# Patient Record
Sex: MALE | Race: WHITE | NOT HISPANIC OR LATINO | Employment: FULL TIME | URBAN - NONMETROPOLITAN AREA
[De-identification: names, ages, dates, MRNs, and addresses within clinical notes are randomized per-mention and may not be internally consistent; named-entity substitution may affect disease eponyms.]

---

## 2017-02-19 ENCOUNTER — OFFICE VISIT (OUTPATIENT)
Dept: RETAIL CLINIC | Facility: CLINIC | Age: 22
End: 2017-02-19

## 2017-02-19 VITALS — TEMPERATURE: 102.4 F | OXYGEN SATURATION: 98 % | RESPIRATION RATE: 18 BRPM | HEART RATE: 115 BPM

## 2017-02-19 DIAGNOSIS — J10.1 INFLUENZA A: Primary | ICD-10-CM

## 2017-02-19 LAB
EXPIRATION DATE: ABNORMAL
FLUAV AG NPH QL: POSITIVE
FLUBV AG NPH QL: NEGATIVE
INTERNAL CONTROL: ABNORMAL
Lab: ABNORMAL

## 2017-02-19 PROCEDURE — 87804 INFLUENZA ASSAY W/OPTIC: CPT | Performed by: NURSE PRACTITIONER

## 2017-02-19 PROCEDURE — 99213 OFFICE O/P EST LOW 20 MIN: CPT | Performed by: NURSE PRACTITIONER

## 2017-02-19 RX ORDER — OSELTAMIVIR PHOSPHATE 75 MG/1
75 CAPSULE ORAL 2 TIMES DAILY
Qty: 10 CAPSULE | Refills: 0 | Status: SHIPPED | OUTPATIENT
Start: 2017-02-19 | End: 2017-02-24

## 2017-02-19 RX ORDER — DEXTROMETHORPHAN HYDROBROMIDE AND PROMETHAZINE HYDROCHLORIDE 15; 6.25 MG/5ML; MG/5ML
5 SYRUP ORAL 4 TIMES DAILY PRN
Qty: 118 ML | Refills: 0 | Status: SHIPPED | OUTPATIENT
Start: 2017-02-19 | End: 2017-02-24

## 2017-02-19 NOTE — PROGRESS NOTES
URI      Wendy Aj is a 21 y.o. male.     URI    This is a new problem. Episode onset: 2 days ago  The problem has been rapidly worsening. Maximum temperature: 100.6 T. Max. Associated symptoms include chest pain (r/t coughing ), congestion, coughing, headaches, nausea, rhinorrhea and a sore throat. Pertinent negatives include no abdominal pain, diarrhea, ear pain, rash, vomiting or wheezing. Treatments tried: Diya [place] Cold and Flu last night; Cough drops  The treatment provided no relief.    Has not had influenza vaccine.  Wife ill with similar symptoms.  See ROS.     There is no problem list on file for this patient.      No Known Allergies     No current outpatient prescriptions on file prior to visit.     No current facility-administered medications on file prior to visit.        Past Medical History   Diagnosis Date   • Migraines        Past Surgical History   Procedure Laterality Date   • Cholecystectomy  10/06/2012       Family History   Problem Relation Age of Onset   • No Known Problems Mother    • No Known Problems Father    • No Known Problems Sister    • No Known Problems Brother        Social History     Social History   • Marital status:      Spouse name: N/A   • Number of children: N/A   • Years of education: N/A     Occupational History   • Not on file.     Social History Main Topics   • Smoking status: Never Smoker   • Smokeless tobacco: Never Used   • Alcohol use No   • Drug use: No   • Sexual activity: Yes     Partners: Female     Other Topics Concern   • Not on file     Social History Narrative   • No narrative on file       Travel:  No recent travel within the last 21 days outside the [place] Denies recent travel to one of the following [place]ries:  [place], [place], [place], or [place].  Denies contact with anyone who has traveled to one of the following [place]  Countries: [place], [place], [place], or [place] within the last 21 days and is known or  suspected to have Ebola.  Denies having had any contact with the human remains, blood or any bodily fluids of someone who is known or suspected to have Ebola within the last 21 days.     Review of Systems   Constitutional: Positive for chills, diaphoresis, fatigue and fever.   HENT: Positive for congestion, postnasal drip, rhinorrhea, sore throat and voice change (hoarseness ). Negative for ear discharge, ear pain, mouth sores and nosebleeds.    Respiratory: Positive for cough. Negative for shortness of breath and wheezing.    Cardiovascular: Positive for chest pain (r/t coughing ).   Gastrointestinal: Positive for nausea. Negative for abdominal pain, diarrhea and vomiting.   Musculoskeletal: Positive for myalgias.   Skin: Negative for rash.   Neurological: Positive for dizziness and headaches.       Visit Vitals   • Pulse 115   • Temp (!) 102.4 °F (39.1 °C) (Oral)   • Resp 18   • SpO2 98%       Objective   Physical Exam   Constitutional: He is oriented to person, place, and time. He appears well-developed and well-nourished. He is cooperative. He appears ill. No distress.   HENT:   Head: Normocephalic and atraumatic.   Right Ear: Tympanic membrane, external ear and ear canal normal.   Left Ear: Tympanic membrane, external ear and ear canal normal.   Nose: Rhinorrhea present. Right sinus exhibits no maxillary sinus tenderness and no frontal sinus tenderness. Left sinus exhibits no maxillary sinus tenderness and no frontal sinus tenderness.   Mouth/Throat: Uvula is midline, oropharynx is clear and moist and mucous membranes are normal. Tonsils are 2+ on the right. Tonsils are 2+ on the left. No tonsillar exudate.   Bilateral nasal congestion, tonsil stone left tonsil   Cardiovascular: Normal rate, regular rhythm and normal heart sounds.    Pulmonary/Chest: Effort normal and breath sounds normal.   Lymphadenopathy:        Head (right side): Tonsillar adenopathy present.        Head (left side): Tonsillar adenopathy  present.     He has no cervical adenopathy.   Neurological: He is alert and oriented to person, place, and time.   Skin: Skin is warm and intact. No rash noted. He is diaphoretic.       Assessment/Plan   Dusty was seen today for uri.    Diagnoses and all orders for this visit:    Influenza A  -     POCT Influenza A/B    Other orders  -     oseltamivir (TAMIFLU) 75 MG capsule; Take 1 capsule by mouth 2 (Two) Times a Day for 5 days.  -     promethazine-dextromethorphan (PROMETHAZINE-DM) 6.25-15 MG/5ML syrup; Take 5 mL by mouth 4 (Four) Times a Day As Needed for cough for up to 5 days.        Results for orders placed or performed in visit on 02/19/17   POCT Influenza A/B   Result Value Ref Range    Rapid Influenza A Ag Positive     Rapid Influenza B Ag negative     Internal Control Passed Passed    Lot Number [id number]     Expiration Date 8/2017        Return if symptoms worsen or fail to improve.

## 2017-02-19 NOTE — PATIENT INSTRUCTIONS
"Influenza, Adult  Influenza (\"the flu\") is a viral infection of the respiratory tract. It occurs more often in winter months because people spend more time in close contact with one another. Influenza can make you feel very sick. Influenza easily spreads from person to person (contagious).  CAUSES   Influenza is caused by a virus that infects the respiratory tract. You can catch the virus by breathing in droplets from an infected person's cough or sneeze. You can also catch the virus by touching something that was recently contaminated with the virus and then touching your mouth, nose, or eyes.  RISKS AND COMPLICATIONS  You may be at risk for a more severe case of influenza if you smoke cigarettes, have diabetes, have chronic heart disease (such as heart failure) or lung disease (such as asthma), or if you have a weakened immune system. Elderly people and pregnant women are also at risk for more serious infections. The most common problem of influenza is a lung infection (pneumonia). Sometimes, this problem can require emergency medical care and may be life threatening.  SIGNS AND SYMPTOMS   Symptoms typically last 4 to 10 days and may include:  · Fever.  · Chills.  · Headache, body aches, and muscle aches.  · Sore throat.  · Chest discomfort and cough.  · Poor appetite.  · Weakness or feeling tired.  · Dizziness.  · Nausea or vomiting.  DIAGNOSIS   Diagnosis of influenza is often made based on your history and a physical exam. A nose or throat swab test can be done to confirm the diagnosis.  TREATMENT   In mild cases, influenza goes away on its own. Treatment is directed at relieving symptoms. For more severe cases, your health care provider may prescribe antiviral medicines to shorten the sickness. Antibiotic medicines are not effective because the infection is caused by a virus, not by bacteria.  HOME CARE INSTRUCTIONS  · Take medicines only as directed by your health care provider.  · Use a cool mist humidifier " to make breathing easier.  · Get plenty of rest until your temperature returns to normal. This usually takes 3 to 4 days.  · Drink enough fluid to keep your urine clear or pale yellow.  · Cover your mouth and nose when coughing or sneezing, and wash your hands well to prevent the virus from spreading.  · Stay home from work or school until the fever is gone for at least 1 full day.  PREVENTION   An annual influenza vaccination (flu shot) is the best way to avoid getting influenza. An annual flu shot is now routinely recommended for all adults in the [place]  SEEK MEDICAL CARE IF:  · You experience chest pain, your cough worsens, or you produce more mucus.  · You have nausea, vomiting, or diarrhea.  · Your fever returns or gets worse.  SEEK IMMEDIATE MEDICAL CARE IF:  · You have trouble breathing, you become short of breath, or your skin or nails become bluish.  · You have severe pain or stiffness in the neck.  · You develop a sudden headache, or pain in the face or ear.  · You have nausea or vomiting that you cannot control.  MAKE SURE YOU:   · Understand these instructions.  · Will watch your condition.  · Will get help right away if you are not doing well or get worse.     This information is not intended to replace advice given to you by your health care provider. Make sure you discuss any questions you have with your health care provider.     Document Released: 12/15/2001 Document Revised: 01/08/2016 Document Reviewed: 10/11/2016  [employer] Patient Education ©2016 [employer]

## 2017-02-20 RX ORDER — DEXTROMETHORPHAN HYDROBROMIDE AND PROMETHAZINE HYDROCHLORIDE 15; 6.25 MG/5ML; MG/5ML
SYRUP ORAL
Qty: 118 ML | Refills: 0 | OUTPATIENT
Start: 2017-02-20

## 2017-05-15 ENCOUNTER — OFFICE VISIT (OUTPATIENT)
Dept: RETAIL CLINIC | Facility: CLINIC | Age: 22
End: 2017-05-15

## 2017-05-15 VITALS
HEART RATE: 74 BPM | HEIGHT: 72 IN | OXYGEN SATURATION: 98 % | WEIGHT: 173 LBS | BODY MASS INDEX: 23.43 KG/M2 | TEMPERATURE: 98.5 F

## 2017-05-15 DIAGNOSIS — H66.001 ACUTE SUPPURATIVE OTITIS MEDIA OF RIGHT EAR WITHOUT SPONTANEOUS RUPTURE OF TYMPANIC MEMBRANE, RECURRENCE NOT SPECIFIED: Primary | ICD-10-CM

## 2017-05-15 PROCEDURE — 99213 OFFICE O/P EST LOW 20 MIN: CPT | Performed by: NURSE PRACTITIONER

## 2017-05-15 RX ORDER — AMOXICILLIN 500 MG/1
500 CAPSULE ORAL 2 TIMES DAILY
Qty: 20 CAPSULE | Refills: 0 | Status: SHIPPED | OUTPATIENT
Start: 2017-05-15 | End: 2017-05-25

## 2019-04-15 ENCOUNTER — OFFICE VISIT (OUTPATIENT)
Dept: UROLOGY | Facility: CLINIC | Age: 24
End: 2019-04-15

## 2019-04-15 VITALS
SYSTOLIC BLOOD PRESSURE: 130 MMHG | BODY MASS INDEX: 22.82 KG/M2 | HEIGHT: 73 IN | HEART RATE: 58 BPM | OXYGEN SATURATION: 99 % | DIASTOLIC BLOOD PRESSURE: 82 MMHG

## 2019-04-15 DIAGNOSIS — R39.9 LOWER URINARY TRACT SYMPTOMS (LUTS): ICD-10-CM

## 2019-04-15 DIAGNOSIS — R30.0 DYSURIA: Primary | ICD-10-CM

## 2019-04-15 DIAGNOSIS — R10.2 PELVIC PAIN: ICD-10-CM

## 2019-04-15 LAB
BILIRUB BLD-MCNC: NEGATIVE MG/DL
CLARITY, POC: CLEAR
COLOR UR: YELLOW
GLUCOSE UR STRIP-MCNC: NEGATIVE MG/DL
KETONES UR QL: NEGATIVE
LEUKOCYTE EST, POC: NEGATIVE
NITRITE UR-MCNC: NEGATIVE MG/ML
PH UR: 6 [PH] (ref 5–8)
PROT UR STRIP-MCNC: NEGATIVE MG/DL
RBC # UR STRIP: NEGATIVE /UL
SP GR UR: 1.02 (ref 1–1.03)
UROBILINOGEN UR QL: NORMAL

## 2019-04-15 PROCEDURE — 81003 URINALYSIS AUTO W/O SCOPE: CPT | Performed by: UROLOGY

## 2019-04-15 PROCEDURE — 51798 US URINE CAPACITY MEASURE: CPT | Performed by: UROLOGY

## 2019-04-15 PROCEDURE — 99244 OFF/OP CNSLTJ NEW/EST MOD 40: CPT | Performed by: UROLOGY

## 2019-04-15 NOTE — PROGRESS NOTES
Chief Complaint  LUTS    Referring Provider  Miriam Quevedo MD    HPI  Mr. Aj is a 24 y.o. male with history of migraines who presents with dysuria. This has been ongoing for a year, intermittent, worse when dehydrated and urine is dark, not associated with urgency or frequency. This happens approx 1 time per day.   Says his stream is weaker than normal. No recent or notable trauma to perineum  No nocturia.   No gross hematuria.   No Hx of urinary infections or STDs. [place] with wife only.   + pelvic pressure, mild 4/10  No perineal pain    Previous treatments include: Abx, pyridium    no Constipation  no History of kidney stones  Drinks 8 glasses of water a day    Past Medical History  Past Medical History:   Diagnosis Date   • Migraines        Past Surgical History  Past Surgical History:   Procedure Laterality Date   • CHOLECYSTECTOMY  10/06/2012       Medications  No current outpatient medications on file.    Allergies  No Known Allergies    Social History  Social History     Socioeconomic History   • Marital status:      Spouse name: Not on file   • Number of children: Not on file   • Years of education: Not on file   • Highest education level: Not on file   Tobacco Use   • Smoking status: Never Smoker   • Smokeless tobacco: Never Used   Substance and Sexual Activity   • Alcohol use: No   • Drug use: No   • Sexual activity: Yes     Partners: Female       Family History  He has no family history of prostate cancer    Review of Systems  Constitutional: No fevers or chills  Skin: Negative for rash  Endocrine: No heat/cold intolerance   Cardiovascular: Negative for chest pain or dyspnea on exertion  Respiratory: Negative for shortness of breath or wheezing  Gastrointestinal: No nausea or vomiting  Genitourinary: Negative for current gross hematuria.  Musculoskeletal: No flank pain  Neurological:  Negative for frequent headaches or dizziness  Lymph/Heme: Negative for leg swelling or calf  "pain.    Physical Exam  Visit Vitals  /82   Pulse 58   Ht 185.4 cm (73\")   SpO2 99%   BMI 22.82 kg/m²     Constitutional: NAD, WDWN.   HEENT: NCAT. Conjunctivae normal.  MMM.    Cardiovascular: Regular rate.  Pulmonary/Chest: Respirations are even and non-labored bilaterally.  Abdominal: Soft. No distension, tenderness, masses or guarding. No CVA tenderness.  Neurological: A + O x 3.  Cranial Nerves II-XII grossly intact. Normal gait.  Extremities: TERELL x 4, Warm. No clubbing.  No cyanosis.    Skin: Pink, warm and dry.  No rashes noted.  Psychiatric:  Normal mood and affect    Genitourinary  Penis: circumcised penis, glans normal, no penile discharge.  No rashes/lesions.    Testes: descended bilaterally, no masses, nontender to palpation. Remainder of scrotal contents normal. No hernia appreciated.  Rectal:  Normal tone, no masses.  Prostate:  30 grams.  Symmetric, non-tender, anodular and no induration.      Labs  No results found for: PSA    Brief Urine Lab Results  (Last result in the past 365 days)      Color   Clarity   Blood   Leuk Est   Nitrite   Protein   CREAT   Urine HCG        04/15/19 0923 Yellow Clear Negative Negative Negative Negative               PVR  Post-void residual performed by staff - 29      Assessment  Mr. Aj is a 24 y.o. male who presents with LUTS, primarily dysuria, pelvic pressure, and weak stream.    Plan  1.  FU for cystoscopy to eval for urethral stricture  2.  Urine for ureaplasma      Aryan Arndt MD    "

## 2019-04-17 LAB
BACTERIA UR CULT: NO GROWTH
BACTERIA UR CULT: NORMAL

## 2019-04-25 ENCOUNTER — PROCEDURE VISIT (OUTPATIENT)
Dept: UROLOGY | Facility: CLINIC | Age: 24
End: 2019-04-25

## 2019-04-25 VITALS — WEIGHT: 173 LBS | RESPIRATION RATE: 16 BRPM | BODY MASS INDEX: 22.93 KG/M2 | HEIGHT: 73 IN

## 2019-04-25 DIAGNOSIS — R39.9 LOWER URINARY TRACT SYMPTOMS (LUTS): Primary | ICD-10-CM

## 2019-04-25 DIAGNOSIS — K59.00 CONSTIPATION, UNSPECIFIED CONSTIPATION TYPE: ICD-10-CM

## 2019-04-25 PROCEDURE — 51741 ELECTRO-UROFLOWMETRY FIRST: CPT | Performed by: UROLOGY

## 2019-04-25 PROCEDURE — 52000 CYSTOURETHROSCOPY: CPT | Performed by: UROLOGY

## 2019-04-25 NOTE — PROGRESS NOTES
Preprocedure diagnosis  LUTS    Postprocedure diagnosis  Same    Procedure  Flexible Cystourethroscopy    Attending surgeon  Aryan Arndt MD    Anesthesia  2% lidocaine jelly intraurethrally    Complications  None    Indications  24 y.o. male undergoing a flexible cystoscopy for the above mentioned indications.  Informed consent was obtained.      Findings  Cystoscopic findings included one right and left ureteral orifice in the normal anatomic position with normal bladder mucosa and no tumors, masses or stones. The urethral urothelium was within normal limits with no strictures.  There was not a prominent median lobe.  The lateral lobes not obstructive in appearance.  There was no urethral pathology to represent obstruction.  There was evidence of constipation from the rectum pressing on the bladder.    Procedure  The patient was placed in supine position and prepped and draped in sterile fashion with lidocaine jelly per urethra for anesthesia.  A timeout was performed.  The 14F flexible cystoscope was lubricated and gently placed through the penile urethra and into the bladder.  The bladder was completely visualized.  The cystoscope was retroflexed and the bladder neck and prostate visualized.  The cystoscope was slowly withdrawn while visualizing the urethra and the procedure terminated.  The patient tolerated the procedure well.      Uroflow:  Peak flow rate -21.5 mL/sec  Average flow rate -11.7 mL/sec  Flow curve -bell-shaped, normal  Voided volume -110 mL mL    I personally reviewed  and interpreted this study.     I provided him reassurance that we did not find any urinary infectious organisms and there is no mechanical or functional abnormality in his urinary stream.  He had evidence of constipation on cystoscopy, and I believe that this is likely factoring into the suprapubic pressure he is feeling.    Plan   Follow-up as needed  I recommended daily MiraLAX and increased water intake

## 2024-01-24 ENCOUNTER — HOSPITAL ENCOUNTER (EMERGENCY)
Facility: HOSPITAL | Age: 29
Discharge: HOME | End: 2024-01-24
Attending: STUDENT IN AN ORGANIZED HEALTH CARE EDUCATION/TRAINING PROGRAM | Admitting: STUDENT IN AN ORGANIZED HEALTH CARE EDUCATION/TRAINING PROGRAM
Payer: COMMERCIAL

## 2024-01-24 ENCOUNTER — APPOINTMENT (OUTPATIENT)
Dept: GENERAL RADIOLOGY | Facility: HOSPITAL | Age: 29
End: 2024-01-24
Payer: COMMERCIAL

## 2024-01-24 VITALS
HEIGHT: 72 IN | HEART RATE: 73 BPM | BODY MASS INDEX: 32.51 KG/M2 | WEIGHT: 240 LBS | TEMPERATURE: 98.2 F | DIASTOLIC BLOOD PRESSURE: 77 MMHG | OXYGEN SATURATION: 98 % | SYSTOLIC BLOOD PRESSURE: 133 MMHG | RESPIRATION RATE: 19 BRPM

## 2024-01-24 DIAGNOSIS — J11.1 INFLUENZA: Primary | ICD-10-CM

## 2024-01-24 LAB
ALBUMIN SERPL-MCNC: 5 G/DL (ref 3.5–5.2)
ALBUMIN/GLOB SERPL: 1.3 G/DL
ALP SERPL-CCNC: 93 U/L (ref 39–117)
ALT SERPL W P-5'-P-CCNC: 37 U/L (ref 1–41)
ANION GAP SERPL CALCULATED.3IONS-SCNC: 12.4 MMOL/L (ref 5–15)
AST SERPL-CCNC: 28 U/L (ref 1–40)
BASOPHILS # BLD AUTO: 0.05 10*3/MM3 (ref 0–0.2)
BASOPHILS NFR BLD AUTO: 0.7 % (ref 0–1.5)
BILIRUB SERPL-MCNC: 0.6 MG/DL (ref 0–1.2)
BUN SERPL-MCNC: 14 MG/DL (ref 6–20)
BUN/CREAT SERPL: 12.5 (ref 7–25)
CALCIUM SPEC-SCNC: 9.7 MG/DL (ref 8.6–10.5)
CHLORIDE SERPL-SCNC: 102 MMOL/L (ref 98–107)
CO2 SERPL-SCNC: 24.6 MMOL/L (ref 22–29)
CREAT SERPL-MCNC: 1.12 MG/DL (ref 0.76–1.27)
DEPRECATED RDW RBC AUTO: 37.4 FL (ref 37–54)
EGFRCR SERPLBLD CKD-EPI 2021: 92.3 ML/MIN/1.73
EOSINOPHIL # BLD AUTO: 0.09 10*3/MM3 (ref 0–0.4)
EOSINOPHIL NFR BLD AUTO: 1.2 % (ref 0.3–6.2)
ERYTHROCYTE [DISTWIDTH] IN BLOOD BY AUTOMATED COUNT: 11.9 % (ref 12.3–15.4)
FLUAV SUBTYP SPEC NAA+PROBE: NOT DETECTED
FLUBV RNA ISLT QL NAA+PROBE: DETECTED
GLOBULIN UR ELPH-MCNC: 3.9 GM/DL
GLUCOSE SERPL-MCNC: 106 MG/DL (ref 65–99)
HCT VFR BLD AUTO: 49.4 % (ref 37.5–51)
HGB BLD-MCNC: 17.3 G/DL (ref 13–17.7)
HOLD SPECIMEN: NORMAL
HOLD SPECIMEN: NORMAL
IMM GRANULOCYTES # BLD AUTO: 0.04 10*3/MM3 (ref 0–0.05)
IMM GRANULOCYTES NFR BLD AUTO: 0.5 % (ref 0–0.5)
LYMPHOCYTES # BLD AUTO: 1.81 10*3/MM3 (ref 0.7–3.1)
LYMPHOCYTES NFR BLD AUTO: 24.5 % (ref 19.6–45.3)
MCH RBC QN AUTO: 30.1 PG (ref 26.6–33)
MCHC RBC AUTO-ENTMCNC: 35 G/DL (ref 31.5–35.7)
MCV RBC AUTO: 86.1 FL (ref 79–97)
MONOCYTES # BLD AUTO: 0.56 10*3/MM3 (ref 0.1–0.9)
MONOCYTES NFR BLD AUTO: 7.6 % (ref 5–12)
NEUTROPHILS NFR BLD AUTO: 4.85 10*3/MM3 (ref 1.7–7)
NEUTROPHILS NFR BLD AUTO: 65.5 % (ref 42.7–76)
NRBC BLD AUTO-RTO: 0 /100 WBC (ref 0–0.2)
PLATELET # BLD AUTO: 304 10*3/MM3 (ref 140–450)
PMV BLD AUTO: 9.9 FL (ref 6–12)
POTASSIUM SERPL-SCNC: 3.9 MMOL/L (ref 3.5–5.2)
PROT SERPL-MCNC: 8.9 G/DL (ref 6–8.5)
RBC # BLD AUTO: 5.74 10*6/MM3 (ref 4.14–5.8)
SARS-COV-2 RNA RESP QL NAA+PROBE: NOT DETECTED
SODIUM SERPL-SCNC: 139 MMOL/L (ref 136–145)
TROPONIN T SERPL HS-MCNC: 9 NG/L
WBC NRBC COR # BLD AUTO: 7.4 10*3/MM3 (ref 3.4–10.8)
WHOLE BLOOD HOLD COAG: NORMAL
WHOLE BLOOD HOLD SPECIMEN: NORMAL

## 2024-01-24 PROCEDURE — 71045 X-RAY EXAM CHEST 1 VIEW: CPT

## 2024-01-24 PROCEDURE — 80053 COMPREHEN METABOLIC PANEL: CPT | Performed by: STUDENT IN AN ORGANIZED HEALTH CARE EDUCATION/TRAINING PROGRAM

## 2024-01-24 PROCEDURE — 99284 EMERGENCY DEPT VISIT MOD MDM: CPT

## 2024-01-24 PROCEDURE — 93005 ELECTROCARDIOGRAM TRACING: CPT | Performed by: STUDENT IN AN ORGANIZED HEALTH CARE EDUCATION/TRAINING PROGRAM

## 2024-01-24 PROCEDURE — 84484 ASSAY OF TROPONIN QUANT: CPT | Performed by: STUDENT IN AN ORGANIZED HEALTH CARE EDUCATION/TRAINING PROGRAM

## 2024-01-24 PROCEDURE — 87636 SARSCOV2 & INF A&B AMP PRB: CPT | Performed by: STUDENT IN AN ORGANIZED HEALTH CARE EDUCATION/TRAINING PROGRAM

## 2024-01-24 PROCEDURE — 85025 COMPLETE CBC W/AUTO DIFF WBC: CPT | Performed by: STUDENT IN AN ORGANIZED HEALTH CARE EDUCATION/TRAINING PROGRAM

## 2024-01-24 RX ORDER — SODIUM CHLORIDE 0.9 % (FLUSH) 0.9 %
10 SYRINGE (ML) INJECTION AS NEEDED
Status: DISCONTINUED | OUTPATIENT
Start: 2024-01-24 | End: 2024-01-24 | Stop reason: HOSPADM

## 2024-01-24 RX ORDER — ASPIRIN 325 MG
325 TABLET ORAL ONCE
Status: COMPLETED | OUTPATIENT
Start: 2024-01-24 | End: 2024-01-24

## 2024-01-24 RX ADMIN — ASPIRIN 325 MG: 325 TABLET ORAL at 17:51

## 2024-01-24 NOTE — CONSULTS
17:57 Dr. Sewell requested resources be given to patient. Patient did not want a consult.  18:00 Therapist met with patient at bedside and provided patient with crisis lines, outpatient resources and coping skills for anxiety.  Therapist also discussed the availability of emergency behavioral health services 24/7 at through the [facility] and [facility].  Therapist assisted the patient in identifying risk factors that would indicate the need for higher level of care, such as acute withdrawal symptoms, thoughts to harm self or others and/or self-harming behavior(s). Encouraged patient to call 911, crisis hotlines, or present to the nearest emergency department should symptoms worsen, or in any crisis/emergency. The patient is agreeable and voiced understanding.      Aaron Chávez MA LPCA  01/24/2024

## 2024-01-24 NOTE — Clinical Note
[facility] EMERGENCY DEPARTMENT  [address] [address]  Phone: [phone]    Dusty Aj was seen and treated in our emergency department on 1/24/2024.  He may return to work on 01/27/2024.         Thank you for choosing [facility].    Alhaji Sewell MD

## 2024-01-24 NOTE — ED PROVIDER NOTES
Subjective:  History of Present Illness:    Patient is a 28-year-old male no significant medical history who presents today with concerns for chest pain and anxiety.  He reports intermittent chest pain over the last 2 months.  Says that he has had increased stress over the last 2 weeks that has noted increasing chest pain.  Had an especially bad episode today and presents to our emergency department for evaluation.  States he has no current chest pain at this time.  Denies any shortness of breath.  No cough or congestion.  Denies any preceding fevers.  No abdominal pain nausea vomiting or diarrhea.  No unilateral leg swelling or leg pain.  Denies any personal history of PE/DVT.      Nurses Notes reviewed and agree, including vitals, allergies, social history and prior medical history.     REVIEW OF SYSTEMS: All systems reviewed and not pertinent unless noted.  Review of Systems   Constitutional:  Negative for activity change, appetite change, chills, fatigue and fever.   HENT:  Negative for congestion, sinus pressure, sneezing and trouble swallowing.    Eyes:  Negative for discharge and itching.   Respiratory:  Negative for cough and shortness of breath.    Cardiovascular:  Positive for chest pain. Negative for palpitations.   Gastrointestinal:  Negative for abdominal distention and abdominal pain.   Endocrine: Negative for cold intolerance and heat intolerance.   Genitourinary:  Negative for decreased urine volume, dysuria and urgency.   Musculoskeletal:  Negative for gait problem, neck pain and neck stiffness.   Skin:  Negative for color change and rash.   Allergic/Immunologic: Negative for immunocompromised state.   Neurological:  Negative for facial asymmetry and headaches.   Hematological:  Negative for adenopathy.   Psychiatric/Behavioral:  Negative for self-injury and suicidal ideas. The patient is nervous/anxious.        Past Medical History:   Diagnosis Date    Migraines        Allergies:    Patient has no  "known allergies.      Past Surgical History:   Procedure Laterality Date    CHOLECYSTECTOMY  10/06/2012    COLON SURGERY           Social History     Socioeconomic History    Marital status:    Tobacco Use    Smoking status: Never    Smokeless tobacco: Never   Substance and Sexual Activity    Alcohol use: Yes     Comment: social    Drug use: No    Sexual activity: Yes     Partners: Female         Family History   Problem Relation Age of Onset    No Known Problems Mother     No Known Problems Father     No Known Problems Sister     No Known Problems Brother        Objective  Physical Exam:  /77   Pulse 73   Temp 98.2 °F (36.8 °C) (Oral)   Resp 19   Ht 182.9 cm (72\")   Wt 109 kg (240 lb)   SpO2 98%   BMI 32.55 kg/m²      Physical Exam  Constitutional:       General: He is not in acute distress.     Appearance: Normal appearance. He is normal weight. He is not ill-appearing.   HENT:      Head: Normocephalic and atraumatic.      Nose: Nose normal. No congestion or rhinorrhea.      Mouth/Throat:      Mouth: Mucous membranes are moist.      Pharynx: Oropharynx is clear.   Eyes:      Extraocular Movements: Extraocular movements intact.      Conjunctiva/sclera: Conjunctivae normal.      Pupils: Pupils are equal, round, and reactive to light.   Cardiovascular:      Rate and Rhythm: Normal rate and regular rhythm.      Pulses: Normal pulses.   Pulmonary:      Effort: Pulmonary effort is normal. No respiratory distress.      Breath sounds: Normal breath sounds.   Abdominal:      General: Abdomen is flat. Bowel sounds are normal. There is no distension.      Palpations: Abdomen is soft.      Tenderness: There is no abdominal tenderness.   Musculoskeletal:         General: No swelling or tenderness. Normal range of motion.      Cervical back: Normal range of motion and neck supple. No rigidity or tenderness.   Skin:     General: Skin is warm and dry.      Capillary Refill: Capillary refill takes less than 2 " seconds.   Neurological:      General: No focal deficit present.      Mental Status: He is alert and oriented to person, place, and time. Mental status is at baseline.      Cranial Nerves: No cranial nerve deficit.      Sensory: No sensory deficit.      Motor: No weakness.   Psychiatric:         Mood and Affect: Mood normal.         Behavior: Behavior normal.         Thought Content: Thought content normal.         Judgment: Judgment normal.         Procedures    ED Course:    ED Course as of 01/25/24 1530   Wed Jan 24, 2024   1740 EKG interpreted by me, normal sinus rhythm no clear ST changes noted, rate of 85 [JE]      ED Course User Index  [JE] Alhaji Sewell MD       Lab Results (last 24 hours)       Procedure Component Value Units Date/Time    CBC & Differential [[id number]]  (Abnormal) Collected: 01/24/24 1712    Specimen: Blood Updated: 01/24/24 1724    Narrative:      The following orders were created for panel order CBC & Differential.  Procedure                               Abnormality         Status                     ---------                               -----------         ------                     CBC Auto Differential[[id number]]        Abnormal            Final result                 Please view results for these tests on the individual orders.    Comprehensive Metabolic Panel [[id number]]  (Abnormal) Collected: 01/24/24 1712    Specimen: Blood Updated: 01/24/24 1735     Glucose 106 mg/dL      BUN 14 mg/dL      Creatinine 1.12 mg/dL      Sodium 139 mmol/L      Potassium 3.9 mmol/L      Chloride 102 mmol/L      CO2 24.6 mmol/L      Calcium 9.7 mg/dL      Total Protein 8.9 g/dL      Albumin 5.0 g/dL      ALT (SGPT) 37 U/L      AST (SGOT) 28 U/L      Alkaline Phosphatase 93 U/L      Total Bilirubin 0.6 mg/dL      Globulin 3.9 gm/dL      A/G Ratio 1.3 g/dL      BUN/Creatinine Ratio 12.5     Anion Gap 12.4 mmol/L      eGFR 92.3 mL/min/1.73     Narrative:      GFR Normal >60  Chronic Kidney Disease  <60  Kidney Failure <15      High Sensitivity Troponin T [[id number]]  (Normal) Collected: 01/24/24 1712    Specimen: Blood Updated: 01/24/24 1738     HS Troponin T 9 ng/L     Narrative:      High Sensitive Troponin T Reference Range:  <14.0 ng/L- Negative Female for AMI  <22.0 ng/L- Negative Male for AMI  >=14 - Abnormal Female indicating possible myocardial injury.  >=22 - Abnormal Male indicating possible myocardial injury.   Clinicians would have to utilize clinical acumen, EKG, Troponin, and serial changes to determine if it is an Acute Myocardial Infarction or myocardial injury due to an underlying chronic condition.         CBC Auto Differential [[id number]]  (Abnormal) Collected: 01/24/24 1712    Specimen: Blood Updated: 01/24/24 1724     WBC 7.40 10*3/mm3      RBC 5.74 10*6/mm3      Hemoglobin 17.3 g/dL      Hematocrit 49.4 %      MCV 86.1 fL      MCH 30.1 pg      MCHC 35.0 g/dL      RDW 11.9 %      RDW-SD 37.4 fl      MPV 9.9 fL      Platelets 304 10*3/mm3      Neutrophil % 65.5 %      Lymphocyte % 24.5 %      Monocyte % 7.6 %      Eosinophil % 1.2 %      Basophil % 0.7 %      Immature Grans % 0.5 %      Neutrophils, Absolute 4.85 10*3/mm3      Lymphocytes, Absolute 1.81 10*3/mm3      Monocytes, Absolute 0.56 10*3/mm3      Eosinophils, Absolute 0.09 10*3/mm3      Basophils, Absolute 0.05 10*3/mm3      Immature Grans, Absolute 0.04 10*3/mm3      nRBC 0.0 /100 WBC     COVID-19 and FLU A/B PCR, 1 HR TAT - Swab, Nasopharynx [[id number]]  (Abnormal) Collected: 01/24/24 1742    Specimen: Swab from Nasopharynx Updated: 01/24/24 1810     COVID19 Not Detected     Influenza A PCR Not Detected     Influenza B PCR Detected    Narrative:      Fact sheet for providers: https://www.fda.gov/media/061385/download    Fact sheet for patients: https://www.fda.gov/media/472899/download    Test performed by PCR.             XR Chest 1 View    Result Date: 1/25/2024  PROCEDURE: XR CHEST 1 VW-    HISTORY: Chest Pain Triage Protocol,  anxiety, precordial chest pain intermittently  COMPARISON: None.  FINDINGS: The heart is normal in size. The mediastinum is unremarkable. The lungs are clear. There is no pneumothorax. There are no acute osseous abnormalities.      Impression: No acute cardiopulmonary process.        Images were reviewed, interpreted, and dictated by Dr. Sandra Richey MD Transcribed by Aurora Thomas PA-C.  This report was signed and finalized on 1/25/2024 12:21 PM by Sandra Richey MD.          MDM     Amount and/or Complexity of Data Reviewed  Independent visualization of images, tracings, or specimens: yes        Initial impression of presenting illness: Chest pain    DDX: includes but is not limited to: ACS, MI, panic disorder    Patient arrives stable with vitals interpreted by myself.     Pertinent features from physical exam: Clear to auscultation, regular rate and rhythm, no murmur.    Initial diagnostic plan: CBC, CMP, troponin, ECG, chest x-ray    Results from initial plan were reviewed and interpreted by me revealing patient positive for influenza    Diagnostic information from other sources: Discussed with significant other bedside    Interventions / Re-evaluation: Observed in emergency department for over an hour with no change in vital signs    Results/clinical rationale were discussed with patient at bedside    Consultations/Discussion of results with other physicians: Discussed negative workup in our emergency department for cardiac process, patient's pain likely secondary to influenza diagnosis.  No shortness of breath, no pneumonia.  Encouraged oral hydration and PCP follow-up to ensure resolution of symptoms    Disposition plan: Discharge  -----        Final diagnoses:   Influenza          Alhaji Sewell MD  01/25/24 1682

## 2024-01-24 NOTE — DISCHARGE INSTRUCTIONS
You were evaluated for chest pain.  We got labs and chest x-ray that were all negative for any acute process.  You were swabbed for COVID and flu and you had the flu.  You are now stable for discharge.  As we discussed, we believe that your symptoms are likely due to the influenza virus causing pleurisy which is a swelling between your lungs and your rib cage.  We also provided you with some resources for behavioral health as an outpatient which would encourage you to follow-up with for further management evaluation of your anxiety.  You are now stable for discharge.

## 2024-02-18 ENCOUNTER — HOSPITAL ENCOUNTER (EMERGENCY)
Facility: HOSPITAL | Age: 29
Discharge: HOME | End: 2024-02-18
Attending: EMERGENCY MEDICINE | Admitting: EMERGENCY MEDICINE
Payer: COMMERCIAL

## 2024-02-18 VITALS
RESPIRATION RATE: 18 BRPM | TEMPERATURE: 98 F | BODY MASS INDEX: 31.83 KG/M2 | OXYGEN SATURATION: 97 % | HEART RATE: 86 BPM | WEIGHT: 235 LBS | DIASTOLIC BLOOD PRESSURE: 74 MMHG | HEIGHT: 72 IN | SYSTOLIC BLOOD PRESSURE: 132 MMHG

## 2024-02-18 DIAGNOSIS — I10 HYPERTENSION, UNSPECIFIED TYPE: Primary | ICD-10-CM

## 2024-02-18 DIAGNOSIS — F41.9 ANXIETY: ICD-10-CM

## 2024-02-18 DIAGNOSIS — M25.512 ACUTE PAIN OF LEFT SHOULDER: ICD-10-CM

## 2024-02-18 LAB
ALBUMIN SERPL-MCNC: 4.7 G/DL (ref 3.5–5.2)
ALBUMIN/GLOB SERPL: 1.6 G/DL
ALP SERPL-CCNC: 89 U/L (ref 39–117)
ALT SERPL W P-5'-P-CCNC: 70 U/L (ref 1–41)
ANION GAP SERPL CALCULATED.3IONS-SCNC: 11.2 MMOL/L (ref 5–15)
AST SERPL-CCNC: 26 U/L (ref 1–40)
BASOPHILS # BLD AUTO: 0.03 10*3/MM3 (ref 0–0.2)
BASOPHILS NFR BLD AUTO: 0.4 % (ref 0–1.5)
BILIRUB SERPL-MCNC: 0.6 MG/DL (ref 0–1.2)
BUN SERPL-MCNC: 10 MG/DL (ref 6–20)
BUN/CREAT SERPL: 9.3 (ref 7–25)
CALCIUM SPEC-SCNC: 9.1 MG/DL (ref 8.6–10.5)
CHLORIDE SERPL-SCNC: 102 MMOL/L (ref 98–107)
CO2 SERPL-SCNC: 23.8 MMOL/L (ref 22–29)
CREAT SERPL-MCNC: 1.08 MG/DL (ref 0.76–1.27)
DEPRECATED RDW RBC AUTO: 36.2 FL (ref 37–54)
EGFRCR SERPLBLD CKD-EPI 2021: 96.5 ML/MIN/1.73
EOSINOPHIL # BLD AUTO: 0.08 10*3/MM3 (ref 0–0.4)
EOSINOPHIL NFR BLD AUTO: 1.2 % (ref 0.3–6.2)
ERYTHROCYTE [DISTWIDTH] IN BLOOD BY AUTOMATED COUNT: 11.8 % (ref 12.3–15.4)
GLOBULIN UR ELPH-MCNC: 2.9 GM/DL
GLUCOSE SERPL-MCNC: 98 MG/DL (ref 65–99)
HCT VFR BLD AUTO: 43.6 % (ref 37.5–51)
HGB BLD-MCNC: 15.1 G/DL (ref 13–17.7)
IMM GRANULOCYTES # BLD AUTO: 0.04 10*3/MM3 (ref 0–0.05)
IMM GRANULOCYTES NFR BLD AUTO: 0.6 % (ref 0–0.5)
LYMPHOCYTES # BLD AUTO: 2.39 10*3/MM3 (ref 0.7–3.1)
LYMPHOCYTES NFR BLD AUTO: 34.9 % (ref 19.6–45.3)
MCH RBC QN AUTO: 29.4 PG (ref 26.6–33)
MCHC RBC AUTO-ENTMCNC: 34.6 G/DL (ref 31.5–35.7)
MCV RBC AUTO: 85 FL (ref 79–97)
MONOCYTES # BLD AUTO: 0.53 10*3/MM3 (ref 0.1–0.9)
MONOCYTES NFR BLD AUTO: 7.7 % (ref 5–12)
NEUTROPHILS NFR BLD AUTO: 3.77 10*3/MM3 (ref 1.7–7)
NEUTROPHILS NFR BLD AUTO: 55.2 % (ref 42.7–76)
NRBC BLD AUTO-RTO: 0 /100 WBC (ref 0–0.2)
PLATELET # BLD AUTO: 273 10*3/MM3 (ref 140–450)
PMV BLD AUTO: 10.3 FL (ref 6–12)
POTASSIUM SERPL-SCNC: 3.4 MMOL/L (ref 3.5–5.2)
PROT SERPL-MCNC: 7.6 G/DL (ref 6–8.5)
RBC # BLD AUTO: 5.13 10*6/MM3 (ref 4.14–5.8)
SODIUM SERPL-SCNC: 137 MMOL/L (ref 136–145)
T4 FREE SERPL-MCNC: 1.61 NG/DL (ref 0.93–1.7)
TROPONIN T SERPL HS-MCNC: <6 NG/L
TSH SERPL DL<=0.05 MIU/L-ACNC: 2.31 UIU/ML (ref 0.27–4.2)
WBC NRBC COR # BLD AUTO: 6.84 10*3/MM3 (ref 3.4–10.8)

## 2024-02-18 PROCEDURE — 80053 COMPREHEN METABOLIC PANEL: CPT | Performed by: EMERGENCY MEDICINE

## 2024-02-18 PROCEDURE — 85025 COMPLETE CBC W/AUTO DIFF WBC: CPT | Performed by: EMERGENCY MEDICINE

## 2024-02-18 PROCEDURE — 84484 ASSAY OF TROPONIN QUANT: CPT | Performed by: EMERGENCY MEDICINE

## 2024-02-18 PROCEDURE — 84443 ASSAY THYROID STIM HORMONE: CPT | Performed by: EMERGENCY MEDICINE

## 2024-02-18 PROCEDURE — 36415 COLL VENOUS BLD VENIPUNCTURE: CPT

## 2024-02-18 PROCEDURE — 99283 EMERGENCY DEPT VISIT LOW MDM: CPT

## 2024-02-18 PROCEDURE — 84439 ASSAY OF FREE THYROXINE: CPT | Performed by: EMERGENCY MEDICINE

## 2024-02-18 RX ORDER — HYDROXYZINE HYDROCHLORIDE 25 MG/1
25 TABLET, FILM COATED ORAL 3 TIMES DAILY PRN
Qty: 20 TABLET | Refills: 0 | Status: SHIPPED | OUTPATIENT
Start: 2024-02-18 | End: 2024-02-26 | Stop reason: SDUPTHER

## 2024-02-18 RX ORDER — LORAZEPAM 0.5 MG/1
1 TABLET ORAL ONCE
Status: COMPLETED | OUTPATIENT
Start: 2024-02-18 | End: 2024-02-18

## 2024-02-18 RX ADMIN — LORAZEPAM 1 MG: 0.5 TABLET ORAL at 08:47

## 2024-02-18 NOTE — ED PROVIDER NOTES
EMERGENCY DEPARTMENT ENCOUNTER    Pt Name: Dusty Aj  MRN: [id number]  Pt :   1995  Room Number:    Date of encounter:  2024  PCP: Provider, No Known  ED Provider: Pawan Torres MD    Historian: Patient      HPI:  Chief Complaint   Patient presents with    Anxiety          Context: Dusty Aj is a 28 y.o. male who presents to the ED c/o hypertension, left shoulder pain, as well as anxiety.  Patient reports his anxiety has been getting worse recently.  Has not been taking medications for, some from increased stress as well as his grandfather being sick.  Patient notes that when he is feeling anxious he checks his blood pressure and his heart rate in the both quite elevated.  Experiences palpitations at these times, no chest pain.  Was here previously for similar symptoms, had a workup and was negative.  He also reports left shoulder pain made worse by leaning on an office chair      PAST MEDICAL HISTORY  Past Medical History:   Diagnosis Date    Migraines          PAST SURGICAL HISTORY  Past Surgical History:   Procedure Laterality Date    CHOLECYSTECTOMY  10/06/2012    COLON SURGERY           FAMILY HISTORY  Family History   Problem Relation Age of Onset    No Known Problems Mother     No Known Problems Father     No Known Problems Sister     No Known Problems Brother          SOCIAL HISTORY  Social History     Socioeconomic History    Marital status:    Tobacco Use    Smoking status: Never    Smokeless tobacco: Never   Substance and Sexual Activity    Alcohol use: Yes     Comment: social    Drug use: No    Sexual activity: Yes     Partners: Female         ALLERGIES  Patient has no known allergies.        REVIEW OF SYSTEMS  Review of Systems   Constitutional:  Negative for chills and fever.   HENT:  Negative for sore throat and trouble swallowing.    Eyes:  Negative for pain and redness.   Respiratory:  Negative for cough and shortness of breath.    Cardiovascular:  Negative  for chest pain and leg swelling.   Gastrointestinal:  Negative for abdominal pain, nausea and vomiting.   Genitourinary:  Negative for dysuria and urgency.   Musculoskeletal:  Negative for back pain and neck pain.   Skin:  Negative for rash and wound.   Neurological:  Negative for dizziness and weakness.   Psychiatric/Behavioral:  The patient is nervous/anxious.         All systems reviewed and negative except for those discussed in HPI.       PHYSICAL EXAM    I have reviewed the triage vital signs and nursing notes.    ED Triage Vitals [02/18/24 0735]   Temp Heart Rate Resp BP SpO2   97.8 °F (36.6 °C) 85 18 147/95 100 %      Temp src Heart Rate Source Patient Position BP Location FiO2 (%)   Oral Monitor Sitting Right arm --       Physical Exam  Constitutional:       Appearance: Normal appearance. He is not ill-appearing.   HENT:      Head: Normocephalic and atraumatic.      Right Ear: External ear normal.      Left Ear: External ear normal.      Nose: Nose normal.      Mouth/Throat:      Mouth: Mucous membranes are moist.      Pharynx: Oropharynx is clear.   Eyes:      Extraocular Movements: Extraocular movements intact.      Conjunctiva/sclera: Conjunctivae normal.      Pupils: Pupils are equal, round, and reactive to light.   Cardiovascular:      Rate and Rhythm: Normal rate and regular rhythm.      Pulses:           Radial pulses are 2+ on the right side and 2+ on the left side.      Heart sounds: No murmur heard.  Pulmonary:      Effort: Pulmonary effort is normal.      Breath sounds: Normal breath sounds.   Abdominal:      General: There is no distension.      Tenderness: There is no abdominal tenderness. There is no guarding.   Musculoskeletal:         General: No swelling or deformity.      Right shoulder: Normal.      Left shoulder: Tenderness present. No bony tenderness. Normal range of motion. Normal strength. Normal pulse.      Cervical back: Normal range of motion and neck supple.   Skin:     General:  Skin is warm and dry.      Capillary Refill: Capillary refill takes less than 2 seconds.      Findings: No rash.   Neurological:      General: No focal deficit present.      Mental Status: He is alert and oriented to person, place, and time.            LAB RESULTS  Recent Results (from the past 24 hour(s))   Comprehensive Metabolic Panel    Collection Time: 02/18/24  8:15 AM    Specimen: Blood   Result Value Ref Range    Glucose 98 65 - 99 mg/dL    BUN 10 6 - 20 mg/dL    Creatinine 1.08 0.76 - 1.27 mg/dL    Sodium 137 136 - 145 mmol/L    Potassium 3.4 (L) 3.5 - 5.2 mmol/L    Chloride 102 98 - 107 mmol/L    CO2 23.8 22.0 - 29.0 mmol/L    Calcium 9.1 8.6 - 10.5 mg/dL    Total Protein 7.6 6.0 - 8.5 g/dL    Albumin 4.7 3.5 - 5.2 g/dL    ALT (SGPT) 70 (H) 1 - 41 U/L    AST (SGOT) 26 1 - 40 U/L    Alkaline Phosphatase 89 39 - 117 U/L    Total Bilirubin 0.6 0.0 - 1.2 mg/dL    Globulin 2.9 gm/dL    A/G Ratio 1.6 g/dL    BUN/Creatinine Ratio 9.3 7.0 - 25.0    Anion Gap 11.2 5.0 - 15.0 mmol/L    eGFR 96.5 >60.0 mL/min/1.73   TSH    Collection Time: 02/18/24  8:15 AM    Specimen: Blood   Result Value Ref Range    TSH 2.310 0.270 - 4.200 uIU/mL   T4, Free    Collection Time: 02/18/24  8:15 AM    Specimen: Blood   Result Value Ref Range    Free T4 1.61 0.93 - 1.70 ng/dL   Single High Sensitivity Troponin T    Collection Time: 02/18/24  8:15 AM    Specimen: Blood   Result Value Ref Range    HS Troponin T <6 <22 ng/L   CBC Auto Differential    Collection Time: 02/18/24  8:15 AM    Specimen: Blood   Result Value Ref Range    WBC 6.84 3.40 - 10.80 10*3/mm3    RBC 5.13 4.14 - 5.80 10*6/mm3    Hemoglobin 15.1 13.0 - 17.7 g/dL    Hematocrit 43.6 37.5 - 51.0 %    MCV 85.0 79.0 - 97.0 fL    MCH 29.4 26.6 - 33.0 pg    MCHC 34.6 31.5 - 35.7 g/dL    RDW 11.8 (L) 12.3 - 15.4 %    RDW-SD 36.2 (L) 37.0 - 54.0 fl    MPV 10.3 6.0 - 12.0 fL    Platelets 273 140 - 450 10*3/mm3    Neutrophil % 55.2 42.7 - 76.0 %    Lymphocyte % 34.9 19.6 - 45.3 %     Monocyte % 7.7 5.0 - 12.0 %    Eosinophil % 1.2 0.3 - 6.2 %    Basophil % 0.4 0.0 - 1.5 %    Immature Grans % 0.6 (H) 0.0 - 0.5 %    Neutrophils, Absolute 3.77 1.70 - 7.00 10*3/mm3    Lymphocytes, Absolute 2.39 0.70 - 3.10 10*3/mm3    Monocytes, Absolute 0.53 0.10 - 0.90 10*3/mm3    Eosinophils, Absolute 0.08 0.00 - 0.40 10*3/mm3    Basophils, Absolute 0.03 0.00 - 0.20 10*3/mm3    Immature Grans, Absolute 0.04 0.00 - 0.05 10*3/mm3    nRBC 0.0 0.0 - 0.2 /100 WBC       If labs were ordered, I independently reviewed the results and considered them in treating the patient.        RADIOLOGY  No Radiology Exams Resulted Within Past 24 Hours        PROCEDURES    Procedures    Interpretations    O2 Sat: The patients oxygen saturation was 100% on Room Air.  This was independently interpreted by me as Normal      Cardiac Monitoring: I reviewed and independently interpreted the Rhythm Strip as Normal Sinus rhythm rate of 85        MEDICATIONS GIVEN IN ER    Medications   LORazepam (ATIVAN) tablet 1 mg (1 mg Oral Given 2/18/24 0847)         MEDICAL DECISION MAKING, PROGRESS, and CONSULTS    All labs, if obtained, have been independently reviewed by me.  All radiology studies, if obtained, have been reviewed by me and the radiologist dictating the report.  All EKG's, if obtained, have been independently viewed and interpreted by me/my attending physician.      Discussion below represents my analysis of pertinent findings related to patient's condition, differential diagnosis, treatment plan and final disposition.      Differential diagnosis:    28-year-old male presented ED with hypertension, anxiety, shoulder pain.  Shoulder pain appears to be muscular in nature, likely rotator cuff strain versus trapezius muscle spasm.  No weakness, bony pain.  Do not think imaging would be helpful in this case.  Patient's anxiety seems to be getting worse, he has no suicidal or homicidal ideations, nothing he would benefit from a  psychiatric consult, will check basic labs including thyroid studies, give the patient oral Ativan and reevaluate    Additional Sources:  None      Orders placed during this visit:  Orders Placed This Encounter   Procedures    Comprehensive Metabolic Panel    TSH    T4, Free    Single High Sensitivity Troponin T    CBC Auto Differential    CBC & Differential         Additional orders considered but not ordered:  None    ED Course:    Consultants:  None    ED Course as of 02/18/24 0902   Sun Feb 18, 2024   0857 Patients labs are benign, bp is improved without intervention, feels better with ativan.  [CS]      ED Course User Index  [CS] Pawan Torres MD           After my consideration of clinical presentation and any laboratory/radiology studies obtained, I discussed the findings with the patient/patient representative who is in agreement with the treatment plan and the final disposition. Risks and benefits of discharge were discussed.     AS OF 09:02 EST VITALS:    BP - 147/95  HR - 85  TEMP - 97.8 °F (36.6 °C) (Oral)  O2 SATS - 100%    I reviewed the patients prescription monitoring report if available prior to discharge    DIAGNOSIS  Final diagnoses:   Hypertension, unspecified type   Anxiety   Acute pain of left shoulder         DISPOSITION  ED Disposition       ED Disposition   Discharge    Condition   Stable    Comment   --                   Please note that portions of this document were completed with voice recognition software.      Pawan Torres MD  02/18/24 0974

## 2024-02-23 ENCOUNTER — OFFICE VISIT (OUTPATIENT)
Dept: INTERNAL MEDICINE | Facility: CLINIC | Age: 29
End: 2024-02-23
Payer: COMMERCIAL

## 2024-02-23 VITALS
DIASTOLIC BLOOD PRESSURE: 84 MMHG | BODY MASS INDEX: 31.89 KG/M2 | HEART RATE: 72 BPM | TEMPERATURE: 98.9 F | HEIGHT: 72 IN | OXYGEN SATURATION: 97 % | WEIGHT: 235.4 LBS | SYSTOLIC BLOOD PRESSURE: 141 MMHG

## 2024-02-23 DIAGNOSIS — F41.9 ANXIETY: Primary | ICD-10-CM

## 2024-02-23 DIAGNOSIS — M25.512 ACUTE PAIN OF LEFT SHOULDER: ICD-10-CM

## 2024-02-23 PROCEDURE — 99204 OFFICE O/P NEW MOD 45 MIN: CPT | Performed by: INTERNAL MEDICINE

## 2024-02-23 RX ORDER — ATENOLOL 25 MG/1
25 TABLET ORAL DAILY
Qty: 30 TABLET | Refills: 5 | Status: SHIPPED | OUTPATIENT
Start: 2024-02-23

## 2024-02-23 RX ORDER — FLUOXETINE HYDROCHLORIDE 20 MG/1
20 CAPSULE ORAL DAILY
Qty: 30 CAPSULE | Refills: 5 | Status: SHIPPED | OUTPATIENT
Start: 2024-02-23

## 2024-02-23 NOTE — PROGRESS NOTES
Wendy Aj is a 29 y.o. male    HPI 29-year-old male coming in for evaluation of complaints of chest discomfort and left shoulder pain has had a history of significant anxiety for which she was following up with behavioral health in the past has had 2 recent ER visits where a chest x-ray was unremarkable routine lab work was unremarkable it was thought that his symptoms were related to anxiety he was placed on hydroxyzine to be taken on a as needed basis.  This gives him some relief for about an hour.  He denies significant alcohol use he lives with his wife and 2 children.    The following portions of the patient's history were reviewed and updated as appropriate: allergies, current medications, past family history, past medical history, past social history, past surgical history, and problem list.     Review of Systems   Constitutional: Negative.  Negative for activity change, appetite change, fatigue and fever.   HENT:  Negative for congestion, ear discharge, ear pain and trouble swallowing.    Eyes:  Negative for photophobia and visual disturbance.   Respiratory:  Negative for cough and shortness of breath.    Cardiovascular:  Negative for chest pain and palpitations.   Gastrointestinal:  Negative for abdominal distention, abdominal pain, constipation, diarrhea, nausea and vomiting.   Endocrine: Negative.    Genitourinary:  Negative for dysuria, hematuria and urgency.   Musculoskeletal:  Positive for arthralgias. Negative for back pain, joint swelling and myalgias.   Skin:  Negative for color change and rash.   Allergic/Immunologic: Negative.    Neurological:  Negative for dizziness, weakness, light-headedness and headaches.   Hematological:  Negative for adenopathy. Does not bruise/bleed easily.   Psychiatric/Behavioral:  Negative for agitation, confusion and dysphoric mood. The patient is not nervous/anxious.        Visit Vitals  /84   Pulse 72   Temp 98.9 °F (37.2 °C)   Ht 182.9 cm  "(72.01\")   Wt 107 kg (235 lb 6.4 oz)   SpO2 97%   BMI 31.92 kg/m²       Objective  Physical Exam  Constitutional:       General: He is not in acute distress.     Appearance: He is well-developed.   HENT:      Nose: Nose normal.   Eyes:      General: No scleral icterus.     Conjunctiva/sclera: Conjunctivae normal.   Neck:      Thyroid: No thyromegaly.      Trachea: No tracheal deviation.   Cardiovascular:      Rate and Rhythm: Normal rate and regular rhythm.      Heart sounds: No murmur heard.     No friction rub.   Pulmonary:      Effort: No respiratory distress.      Breath sounds: No wheezing or rales.   Abdominal:      General: There is no distension.      Palpations: Abdomen is soft. There is no mass.      Tenderness: There is no abdominal tenderness. There is no guarding.   Musculoskeletal:         General: No deformity. Normal range of motion.   Lymphadenopathy:      Cervical: No cervical adenopathy.   Skin:     General: Skin is warm and dry.      Findings: No erythema or rash.   Neurological:      Mental Status: He is alert and oriented to person, place, and time.      Cranial Nerves: No cranial nerve deficit.      Coordination: Coordination normal.      Deep Tendon Reflexes: Reflexes are normal and symmetric.   Psychiatric:         Behavior: Behavior normal.         Thought Content: Thought content normal.         Judgment: Judgment normal.       Diagnoses and all orders for this visit:    Anxiety discussed with patient and wife who is present with him strong family history of anxiety in the family.  His mother is currently doing well on fluoxetine.  Will try fluoxetine 20 mg daily May use hydroxyzine as needed in the meantime I am setting up behavioral health referral  -     Ambulatory Referral to Psychiatry    Acute pain of left shoulder exam without evidence of impingement syndrome.  Currently symptom-free discussed home exercises reviewed x-ray findings with patient these are unremarkable for the " shoulder joint.  Tylenol as needed    Other orders  -     FLUoxetine (PROzac) 20 MG capsule; Take 1 capsule by mouth Daily.  -     atenolol (Tenormin) 25 MG tablet; Take 1 tablet by mouth Daily.

## 2024-02-26 RX ORDER — HYDROXYZINE HYDROCHLORIDE 25 MG/1
25 TABLET, FILM COATED ORAL 3 TIMES DAILY PRN
Qty: 60 TABLET | Refills: 1 | Status: SHIPPED | OUTPATIENT
Start: 2024-02-26

## 2024-02-26 NOTE — TELEPHONE ENCOUNTER
Caller: ARCHANA PHILLIPS    Relationship: Emergency Contact    Best call back number: [phone]    Requested Prescriptions:   Requested Prescriptions     Pending Prescriptions Disp Refills    hydrOXYzine (ATARAX) 25 MG tablet 20 tablet 0     Sig: Take 1 tablet by mouth 3 (Three) Times a Day As Needed for Anxiety.        Pharmacy where request should be sent:    [facility] [phone]  Last office visit with prescribing clinician: 2/23/2024   Last telemedicine visit with prescribing clinician: Visit date not found   Next office visit with prescribing clinician: 5/23/2024     Additional details provided by patient: PATIENT IS OUT OF MEDICATION AND WOULD LIKE A REFILL TO TAKE UNTIL PROZAC KICKS IN. PLEASE ADVISE    Does the patient have less than a 3 day supply:  [x] Yes  [] No    Would you like a call back once the refill request has been completed: [x] Yes [] No    If the office needs to give you a call back, can they leave a voicemail: [x] Yes [] No    Alexandro Osorio Rep   02/26/24 08:32 EST

## 2024-02-26 NOTE — TELEPHONE ENCOUNTER
Caller: ARCHANA PHILLIPS    Relationship: Emergency Contact    Best call back number: [phone]    What is the best time to reach you: ANY TIME    Who are you requesting to speak with (clinical staff, provider,  specific staff member): DR FATIMA    Do you know the name of the person who called: ARCHANA    What was the call regarding: PATIENT IS STILL HAVING SEVERE ACID REFLUX. IS THERE SOMETHING HE CAN BE PRESCRIBED OR WHAT SHOULD HE TAKE FOR IT. PATIENT HAS ALSO HAD DIARRHEA AND THE REFLUX MAKES HIS THROAT AND CHEST FEEL TIGHT.  PLEASE ADVISE

## 2024-02-29 ENCOUNTER — OFFICE VISIT (OUTPATIENT)
Dept: INTERNAL MEDICINE | Facility: CLINIC | Age: 29
End: 2024-02-29
Payer: COMMERCIAL

## 2024-02-29 VITALS
HEIGHT: 72 IN | OXYGEN SATURATION: 97 % | TEMPERATURE: 98.4 F | SYSTOLIC BLOOD PRESSURE: 129 MMHG | WEIGHT: 228.12 LBS | HEART RATE: 57 BPM | BODY MASS INDEX: 30.9 KG/M2 | DIASTOLIC BLOOD PRESSURE: 78 MMHG

## 2024-02-29 DIAGNOSIS — K21.9 GASTROESOPHAGEAL REFLUX DISEASE WITHOUT ESOPHAGITIS: Primary | ICD-10-CM

## 2024-02-29 PROCEDURE — 99213 OFFICE O/P EST LOW 20 MIN: CPT | Performed by: INTERNAL MEDICINE

## 2024-02-29 RX ORDER — ESOMEPRAZOLE MAGNESIUM 40 MG/1
40 CAPSULE, DELAYED RELEASE ORAL
Qty: 30 CAPSULE | Refills: 2 | Status: SHIPPED | OUTPATIENT
Start: 2024-02-29

## 2024-02-29 NOTE — PROGRESS NOTES
"Wendy Aj is a 29 y.o. male    HPI coming in accompanied by his wife has complains of dry mouth and recurring heartburn and indigestion symptoms worse after he takes hydroxyzine.  History of significant anxiety mood is better on fluoxetine.  No alcohol or tobacco use denies NSAID use denies any blood in his stools    The following portions of the patient's history were reviewed and updated as appropriate: allergies, current medications, past family history, past medical history, past social history, past surgical history, and problem list.     Review of Systems   Constitutional: Negative.  Negative for activity change, appetite change, fatigue and fever.   HENT:  Negative for congestion, ear discharge, ear pain and trouble swallowing.    Eyes:  Negative for photophobia and visual disturbance.   Respiratory:  Negative for cough and shortness of breath.    Cardiovascular:  Negative for chest pain and palpitations.   Gastrointestinal:  Negative for abdominal distention, abdominal pain, constipation, diarrhea, nausea and vomiting.   Endocrine: Negative.    Genitourinary:  Negative for dysuria, hematuria and urgency.   Musculoskeletal:  Negative for arthralgias, back pain, joint swelling and myalgias.   Skin:  Negative for color change and rash.   Allergic/Immunologic: Negative.    Neurological:  Negative for dizziness, weakness, light-headedness and headaches.   Hematological:  Negative for adenopathy. Does not bruise/bleed easily.   Psychiatric/Behavioral:  Negative for agitation, confusion and dysphoric mood. The patient is not nervous/anxious.        Visit Vitals  /78   Pulse 57   Temp 98.4 °F (36.9 °C)   Ht 182.9 cm (72.01\")   Wt 103 kg (228 lb 1.9 oz)   SpO2 97%   BMI 30.93 kg/m²       Objective  Physical Exam  Constitutional:       General: He is not in acute distress.     Appearance: He is well-developed.   HENT:      Nose: Nose normal.   Eyes:      General: No scleral icterus.     " Conjunctiva/sclera: Conjunctivae normal.   Neck:      Thyroid: No thyromegaly.      Trachea: No tracheal deviation.   Cardiovascular:      Rate and Rhythm: Normal rate and regular rhythm.      Heart sounds: No murmur heard.     No friction rub.   Pulmonary:      Effort: No respiratory distress.      Breath sounds: No wheezing or rales.   Abdominal:      General: There is no distension.      Palpations: Abdomen is soft. There is no mass.      Tenderness: There is no abdominal tenderness. There is no guarding.   Musculoskeletal:         General: No deformity. Normal range of motion.   Lymphadenopathy:      Cervical: No cervical adenopathy.   Skin:     General: Skin is warm and dry.      Findings: No erythema or rash.   Neurological:      Mental Status: He is alert and oriented to person, place, and time.      Cranial Nerves: No cranial nerve deficit.      Coordination: Coordination normal.      Deep Tendon Reflexes: Reflexes are normal and symmetric.   Psychiatric:         Behavior: Behavior normal.         Thought Content: Thought content normal.         Judgment: Judgment normal.       Diagnoses and all orders for this visit:    Gastroesophageal reflux disease without esophagitis discussed reflux precautions may cut back on hydroxyzine which is causing him to have a dry mouth instead of Pepcid and placing him on Nexium.  Check stool for H. pylori antigen    Other orders  -     Loperamide HCl (IMODIUM PO); Take  by mouth Every Morning.  -     Famotidine (PEPCID AC MAXIMUM STRENGTH PO); Take  by mouth Every Morning.

## 2024-03-01 ENCOUNTER — PATIENT ROUNDING (BHMG ONLY) (OUTPATIENT)
Dept: INTERNAL MEDICINE | Facility: CLINIC | Age: 29
End: 2024-03-01
Payer: COMMERCIAL

## 2024-03-01 NOTE — PROGRESS NOTES
A [employer] message has been sent to patient for PATIENT ROUNDING with [facility].

## 2024-03-03 LAB — H PYLORI AG STL QL IA: NEGATIVE

## 2024-03-28 ENCOUNTER — OFFICE VISIT (OUTPATIENT)
Dept: BEHAVIORAL HEALTH | Facility: CLINIC | Age: 29
End: 2024-03-28
Payer: COMMERCIAL

## 2024-03-28 VITALS — BODY MASS INDEX: 31.29 KG/M2 | WEIGHT: 231 LBS | HEIGHT: 72 IN

## 2024-03-28 DIAGNOSIS — F41.1 GENERALIZED ANXIETY DISORDER: Primary | ICD-10-CM

## 2024-03-28 RX ORDER — PROPRANOLOL HYDROCHLORIDE 20 MG/1
20 TABLET ORAL 3 TIMES DAILY
Qty: 90 TABLET | Refills: 1 | Status: SHIPPED | OUTPATIENT
Start: 2024-03-28

## 2024-03-28 NOTE — PATIENT INSTRUCTIONS
[place], [place]: free library access, including audiobooks    Unf**k Your Brain, Aspen Morin  How to Joselyn Ruiz      [email or web address]    Bilateral stimulation music    Del recommendations:  Analia Gilbert

## 2024-03-28 NOTE — PROGRESS NOTES
"    New Patient Office Visit      Date: 2024  Patient Name: Dusty Aj  : 1995   MRN: [id number]     Referring Provider: Monty Patterson MD    Chief Complaint:      ICD-10-CM ICD-9-CM   1. Generalized anxiety disorder  F41.1 300.02        History of Present Illness:   Dusty Aj is a 29 y.o. male who is here today to establish care with a psychiatric provider, at the recommendation of his primary care provider.  He states, anxieties been a longstanding problem.\"  He has dealt with overwhelming emotions from time to time as he was growing up, but recently, several stressors have been increasing all at once, including a change in job roles, his wife was in a car accident and needed surgeries during her recovery, his own physical illness, then the sudden passing of both his father and his grandfather.  All of this increased stress has led to constant fidgeting, biting nails, overthinking/obsessing, and having a panic attack severe enough that he went to the hospital.  His primary care doctor started him on fluoxetine last month, and at one of his hospital visits, he received a prescription for hydroxyzine.  It does not help with anxiety too much, but it puts him to sleep.  He has a family history of psychiatric issues, and wants to be as proactive as possible.  His first experience with therapy was when he was around 14 years old, and he did not find it to be very beneficial.  No SI/HI/psychotic/manic symptoms present, no compulsive behaviors, and no issues with appetite or sleep.    Subjective      Review of Systems:   Review of Systems   Psychiatric/Behavioral:  Positive for stress. The patient is nervous/anxious.        Screening Scores:   PHQ-9 : 9  SAI-7 : 11  PTSD: 43  MDQ: 7  ASRS-V1.1: negative    Past Psychiatric History:  History of outpatient psychiatrist: no  History of outpatient therapy: around age 14  Previous Inpatient hospitalizations: no  Previous diagnoses: " anxiety/depression  Previous medication trials: buspar (made feel light headed)  History of suicide attempts: no  History of self harming behaviors: no     Abuse/trauma History:              Physical: no              Sexual: no              Emotional/Neglect: no              Death/loss of relationship: just lost his dad              Other trauma: mom had unmanaged bipolar disorder                Substance Abuse History:              Alcohol: quit drinking within last year              Tobacco/Vape: used to vape, quit              Illicit Drugs: no  Marijuana/THC: used to vape Delta-8  Hallucinogens: no                Legal History:  The patient has no significant history of legal issues.     Social History:  Where was patient born: [facility]  Where does patient currently live: 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  Support system: good; wife, friends, mom  Judaism practices: no     Family History:  Family History   Problem Relation Age of Onset    Anxiety disorder Mother         Mulitple siblings and other family members suffer anxiety    Depression Mother         Multiple family members and siblings suffer from depression    Mental illness Mother         Multiple family members and siblings suffer from    No Known Problems Father     No Known Problems Sister     No Known Problems Brother     Hypertension Maternal Grandfather        Family Psychiatric History:   Psych diagnoses: mom has bipolar,   Suicide/self harm attempts: multiple family members have attempted  Substance abuse: probably    Past Medical History:   Past Medical History:   Diagnosis Date    Anxiety Younger    Has gotten worse the last couple months. Especially the last month.    Depression     GERD  "(gastroesophageal reflux disease)     Migraines        Past Surgical History:   Past Surgical History:   Procedure Laterality Date    CHOLECYSTECTOMY  10/06/2012    COLON SURGERY         Medications:     Current Outpatient Medications:     esomeprazole (nexIUM) 40 MG capsule, Take 1 capsule by mouth Every Morning Before Breakfast., Disp: 30 capsule, Rfl: 2    FLUoxetine (PROzac) 20 MG capsule, Take 1 capsule by mouth Daily., Disp: 30 capsule, Rfl: 5    hydrOXYzine (ATARAX) 25 MG tablet, Take 1 tablet by mouth 3 (Three) Times a Day As Needed for Anxiety., Disp: 60 tablet, Rfl: 1    Loperamide HCl (IMODIUM PO), Take  by mouth Every Morning., Disp: , Rfl:     propranolol (INDERAL) 20 MG tablet, Take 1 tablet by mouth 3 (Three) Times a Day., Disp: 90 tablet, Rfl: 1    Medication Considerations:  JOVI reviewed and appropriate.      Allergies:   No Known Allergies    Objective   Vital Signs:   Vitals:    03/28/24 1351   Weight: 105 kg (231 lb)   Height: 182.9 cm (72.01\")     Body mass index is 31.32 kg/m².     Mental Status Exam:   MENTAL STATUS EXAM   General Appearance:  Cleanly groomed and dressed  Eye Contact:  Good eye contact  Attitude:  Cooperative  Motor Activity:  Normal gait, posture, fidgety and restless  Muscle Strength:  Normal  Speech:  Normal rate, tone, volume  Language:  Spontaneous  Mood and affect:  Normal, pleasant and anxious  Hopelessness:  2  Loneliness: 2  Thought Process:  Logical and goal-directed  Associations/ Thought Content:  No delusions  Hallucinations:  None  Suicidal Ideations:  Not present  Homicidal Ideation:  Not present  Sensorium:  Alert and clear  Orientation:  Person, place, time and situation  Immediate Recall, Recent, and Remote Memory:  Intact  Attention Span/ Concentration:  Good  Fund of Knowledge:  Appropriate for age and educational level  Intellectual Functioning:  Average range  Insight:  Good  Judgement:  Good  Reliability:  Good  Impulse Control:  Good       SUICIDE " RISK ASSESSMENT/CSSRS:  1. Does patient have thoughts of suicide? no  2. Does patient have intent for suicide? no  3. Does patient have a current plan for suicide? no  4. History of suicide attempts: no  5. Family history of suicide or attempts: no  6. History of violent behaviors towards others or property or thoughts of committing suicide: no  7. History of sexual aggression toward others: no  8. Access to firearms or weapons: no    Labs Reviewed: n/a  UDS Reviewed: n/a  Chart Reviewed: yes    Assessment / Plan      Quality Measures:   Tobacco cessation: Patient denies tobacco use. No tobacco cessation education necessary.    Depression (PHQ >9): Addressed this visit, medication management, screening score monitoring, and supportive care.    Medication Considerations:  Benzo: n/a  Stimulants: n/a   JOVI reviewed and appropriate.     Safety: No acute safety concerns    Risk Assessment: Risk of self-harm acutely is low. Risk of self-harm chronically is also low, but could be further elevated in the event of treatment noncompliance and/or AODA.    Visit Diagnosis/Orders Placed This Visit:  Diagnoses and all orders for this visit:    1. Generalized anxiety disorder (Primary)  -     propranolol (INDERAL) 20 MG tablet; Take 1 tablet by mouth 3 (Three) Times a Day.  Dispense: 90 tablet; Refill: 1         Impression/Formulation:  Patient appeared alert and oriented.  Patient is voluntarily seeking psychiatric care at [facility].  Patient is receptive to assistance with maintaining a stable lifestyle.  Patient presents with history of     ICD-10-CM ICD-9-CM   1. Generalized anxiety disorder  F41.1 300.02   .     Treatment Plan:   Continue fluoxetine 20 mg as previously prescribed; continue hydroxyzine 25 mg 3 times daily as needed previously prescribed.  Start propranolol 20 mg 3 times daily as needed for anxiety symptoms.  Also discussed establishing care with a therapist, book recommendations,  and discussed nonpharmacologic interventions for anxiety.  Follow-up in 4 weeks.    Patient will continue supportive psychotherapy efforts and medications as indicated. Discussed medication options and treatment plan of prescribed medication(s) as well as the risks, benefits, and potential side effects. Patient ackowledged and verbally consented to continue with current treatment plan and was educated on the importance of compliance with treatment and follow-up appointments. Patient seems reasonably able to adhere to treatment plan.      Assisted Patient in identifying risk factors which would indicate the need for higher level of care including thoughts to harm self or others and/or self-harming behavior and encouraged Patient to contact this office, call 911, or present to the nearest emergency room should any of these events occur. Discussed crisis intervention services and means to access. Clinic will obtain release of information for current treatment team for continuity of care as needed. Patient adamantly and convincingly denies current suicidal or homicidal ideation or perceptual disturbance.     Follow Up:   Return in about 4 weeks (around 4/25/2024) for Medication Management.        SHIRA Bangura  [facility]    This is electronically signed by SHIRA Bangura  03/28/2024 13:57 EDT

## 2024-04-09 DIAGNOSIS — F41.9 ANXIETY: Primary | ICD-10-CM

## 2024-04-09 RX ORDER — HYDROXYZINE HYDROCHLORIDE 25 MG/1
25 TABLET, FILM COATED ORAL 3 TIMES DAILY PRN
Qty: 60 TABLET | Refills: 1 | Status: SHIPPED | OUTPATIENT
Start: 2024-04-09

## 2024-04-09 RX ORDER — HYDROXYZINE HYDROCHLORIDE 25 MG/1
25 TABLET, FILM COATED ORAL 3 TIMES DAILY PRN
Qty: 60 TABLET | Refills: 1 | Status: CANCELLED | OUTPATIENT
Start: 2024-04-09

## 2024-04-09 NOTE — TELEPHONE ENCOUNTER
Incoming Refill Request      Medication requested (name and dose): HYDROXYZINE    Pharmacy where request should be sent: [facility]    Additional details provided by patient: APPT 4/25    Best call back number: [phone]    Does the patient have less than a 3 day supply:  [x] Yes  [] No    Alexandro Au Rep  04/09/24, 14:37 EDT

## 2024-04-25 ENCOUNTER — OFFICE VISIT (OUTPATIENT)
Dept: BEHAVIORAL HEALTH | Facility: CLINIC | Age: 29
End: 2024-04-25
Payer: COMMERCIAL

## 2024-04-25 VITALS — WEIGHT: 233 LBS | BODY MASS INDEX: 31.56 KG/M2 | HEIGHT: 72 IN

## 2024-04-25 DIAGNOSIS — F41.1 GENERALIZED ANXIETY DISORDER: Primary | ICD-10-CM

## 2024-04-25 PROCEDURE — 99213 OFFICE O/P EST LOW 20 MIN: CPT

## 2024-04-25 RX ORDER — FLUOXETINE 10 MG/1
10 CAPSULE ORAL DAILY
Qty: 30 CAPSULE | Refills: 1 | Status: SHIPPED | OUTPATIENT
Start: 2024-04-25

## 2024-04-25 NOTE — PROGRESS NOTES
"               Follow Up Office Visit      Date: 2024   Patient Name: Dusty Aj  : 1995   MRN: [id number]     Referring Provider: Monty Patterson MD    Chief Complaint:      ICD-10-CM ICD-9-CM   1. Generalized anxiety disorder  F41.1 300.02        History of Present Illness:   Dusty Aj is a 29 y.o. male who is here today for follow up with medication management for anxiety.  He states, \"I am doing pretty good.\"  The Prozac seems to be helping him, but he reports that the effects seem to be less than before.  He still gets anxious at work, and occasionally needs to take his propranolol as a breakthrough medication for anxiety symptoms.  His sleep is improved with hydroxyzine, and he does not take it during the day because of sedation.  While some stressors have decreased (his wife's health has improved), other stressors, though mild, have started (softball season, and therefore his time away from home outside of work, is in full swing).  No SI/HI/psychotic/manic symptoms reported, no adverse effects or side effects reported, and patient wishes to continue on current medications, though perhaps with an adjusted dose.    Subjective     Review of Systems:   Review of Systems   Psychiatric/Behavioral:  The patient is nervous/anxious.        Screening Scores:   PHQ-9 : 8 (last visit, 9)  SAI-7 : 12 (last visit, 11)    Medications:     Current Outpatient Medications:     esomeprazole (nexIUM) 40 MG capsule, Take 1 capsule by mouth Every Morning Before Breakfast., Disp: 30 capsule, Rfl: 2    FLUoxetine (PROzac) 20 MG capsule, Take 1 capsule by mouth Daily., Disp: 30 capsule, Rfl: 5    hydrOXYzine (ATARAX) 25 MG tablet, Take 1 tablet by mouth 3 (Three) Times a Day As Needed for Anxiety., Disp: 60 tablet, Rfl: 1    Loperamide HCl (IMODIUM PO), Take  by mouth Every Morning., Disp: , Rfl:     propranolol (INDERAL) 20 MG tablet, Take 1 tablet by mouth 3 (Three) Times a Day., Disp: 90 tablet, Rfl: 1    " "FLUoxetine (PROzac) 10 MG capsule, Take 1 capsule by mouth Daily., Disp: 30 capsule, Rfl: 1    Allergies:   No Known Allergies    Results Reviewed: n/a     The following portion of the patient's history were reviewed and updated appropriately: allergies, current and past medications, family history, medical history and social history.    Objective     Vital Signs:   Vitals:    04/25/24 1051   Weight: 106 kg (233 lb)   Height: 182.9 cm (72.01\")     Body mass index is 31.59 kg/m².     Mental Status Exam:   MENTAL STATUS EXAM   General Appearance:  Cleanly groomed and dressed  Eye Contact:  Good eye contact  Attitude:  Cooperative  Motor Activity:  Normal gait, posture  Muscle Strength:  Normal  Speech:  Normal rate, tone, volume  Language:  Spontaneous  Mood and affect:  Normal, pleasant  Hopelessness:  3  Loneliness: 2  Thought Process:  Logical  Associations/ Thought Content:  No delusions  Hallucinations:  None  Suicidal Ideations:  Not present  Homicidal Ideation:  Not present  Sensorium:  Alert  Orientation:  Person, place, time and situation  Immediate Recall, Recent, and Remote Memory:  Intact  Attention Span/ Concentration:  Good  Fund of Knowledge:  Appropriate for age and educational level  Intellectual Functioning:  Average range  Insight:  Good  Judgement:  Good  Reliability:  Good  Impulse Control:  Good        SUICIDE RISK ASSESSMENT/CSSRS:  1. Does patient have thoughts of suicide? no  2. Does patient have intent for suicide? no  3. Does patient have a current plan for suicide? no  4. History of suicide attempts: no  5. Family history of suicide or attempts: no  6. History of violent behaviors towards others or property or thoughts of committing suicide: no  7. History of sexual aggression toward others: no  8. Access to firearms or weapons: no    Labs Reviewed: n/a  UDS Reviewed: n/a  Chart since last visit reviewed: yes    Assessment / Plan    Quality Measures:  Tobacco cessation: Patient denies " tobacco use. No tobacco cessation education necessary.    Depression (PHQ >9): Addressed this visit, medication management, screening score monitoring, and supportive care.    Medication Considerations:  Benzo: n/a  Stimulants: n/a   JOVI reviewed and appropriate.     Safety: No acute safety concerns    Risk Assessment: Risk of self-harm acutely is low. Risk of self-harm chronically is also low, but could be further elevated in the event of treatment noncompliance and/or AODA.    Visit Diagnosis/Orders Placed This Visit:  Diagnoses and all orders for this visit:    1. Generalized anxiety disorder (Primary)  -     FLUoxetine (PROzac) 10 MG capsule; Take 1 capsule by mouth Daily.  Dispense: 30 capsule; Refill: 1         Impression/Formulation:  Patient appeared alert and oriented.  Patient is voluntarily continuing to receive psychiatric care at [facility].   Patient is receptive to assistance with maintaining a stable lifestyle.  Patient presents with history of     ICD-10-CM ICD-9-CM   1. Generalized anxiety disorder  F41.1 300.02   .     Treatment Plan:   Increase Prozac dose to 30 mg daily; patient has plenty of Prozac 20 mg on hand, sending prescription for Prozac 10 mg.  Follow-up in 4 weeks.    Any medications prescribed have been sent electronically to [facility] in [place].     Patient will continue supportive psychotherapy efforts and medications as indicated.  Discussed medication options and treatment plan of prescribed medication(s) as well as the risks, benefits, and potential side effects. Patient will contact this office, call 911 or present to the nearest emergency room should suicidal or homicidal ideations occur. Clinic will obtain release of information for current treatment team for continuity of care as needed. Patient ackowledged and verbally consented to continue with current treatment plan and was educated on the importance of compliance with treatment and follow-up  appointments.     Follow Up:   Return in about 4 weeks (around 5/23/2024) for Medication Management.        SHIRA Berrios  [facility]    This is electronically signed by SHIRA Berrios  04/25/2024 08:13 EDT

## 2024-05-02 DIAGNOSIS — F41.9 ANXIETY: ICD-10-CM

## 2024-05-02 RX ORDER — HYDROXYZINE HYDROCHLORIDE 25 MG/1
25 TABLET, FILM COATED ORAL 3 TIMES DAILY PRN
Qty: 60 TABLET | Refills: 1 | Status: SHIPPED | OUTPATIENT
Start: 2024-05-02

## 2024-05-02 NOTE — TELEPHONE ENCOUNTER
Incoming Refill Request      Medication requested (name and dose): Hydroxetine 25mg    Pharmacy where request should be sent: [facility]    Additional details provided by patient:     Best call back number: [phone]    Does the patient have less than a 3 day supply:  [x] Yes  [] No    Alexandro Garza Rep  05/02/24, 15:19 EDT

## 2024-05-24 RX ORDER — ESOMEPRAZOLE MAGNESIUM 40 MG/1
40 CAPSULE, DELAYED RELEASE ORAL
Qty: 30 CAPSULE | Refills: 0 | Status: SHIPPED | OUTPATIENT
Start: 2024-05-24

## 2024-05-29 DIAGNOSIS — F41.1 GENERALIZED ANXIETY DISORDER: ICD-10-CM

## 2024-05-29 RX ORDER — PROPRANOLOL HYDROCHLORIDE 20 MG/1
20 TABLET ORAL 3 TIMES DAILY
Qty: 90 TABLET | Refills: 1 | Status: SHIPPED | OUTPATIENT
Start: 2024-05-29

## 2024-05-29 NOTE — TELEPHONE ENCOUNTER
Incoming Refill Request      Medication requested (name and dose): PROPRANOLOL 20MG    Pharmacy where request should be sent: [facility]    Additional details provided by patient: N/A    Best call back number: [phone]    Does the patient have less than a 3 day supply:  [x] Yes  [] No    Alexandro Condon Rep  05/29/24, 14:55 EDT

## 2024-06-24 RX ORDER — ESOMEPRAZOLE MAGNESIUM 40 MG/1
40 CAPSULE, DELAYED RELEASE ORAL
Qty: 90 CAPSULE | Refills: 0 | Status: SHIPPED | OUTPATIENT
Start: 2024-06-24

## 2024-06-25 DIAGNOSIS — F41.1 GENERALIZED ANXIETY DISORDER: ICD-10-CM

## 2024-06-25 DIAGNOSIS — F41.9 ANXIETY: ICD-10-CM

## 2024-06-25 RX ORDER — FLUOXETINE 10 MG/1
10 CAPSULE ORAL DAILY
Qty: 30 CAPSULE | Refills: 0 | Status: SHIPPED | OUTPATIENT
Start: 2024-06-25

## 2024-06-25 RX ORDER — PROPRANOLOL HYDROCHLORIDE 20 MG/1
20 TABLET ORAL 3 TIMES DAILY
Qty: 90 TABLET | Refills: 0 | Status: SHIPPED | OUTPATIENT
Start: 2024-06-25

## 2024-06-25 RX ORDER — HYDROXYZINE HYDROCHLORIDE 25 MG/1
25 TABLET, FILM COATED ORAL 3 TIMES DAILY PRN
Qty: 90 TABLET | Refills: 0 | Status: SHIPPED | OUTPATIENT
Start: 2024-06-25

## 2024-06-25 NOTE — TELEPHONE ENCOUNTER
Incoming Refill Request      Medication requested (name and dose): PROZAC 10 MG, INDERAL 20 MG, ATARAX 25MG     Pharmacy where request should be sent: [facility]    Additional details provided by patient: N/A    Best call back number: [phone]    Does the patient have less than a 3 day supply:  [x] Yes  [] No    Alexandro Condon Rep  06/25/24, 16:07 EDT

## 2024-07-12 DIAGNOSIS — F41.1 GENERALIZED ANXIETY DISORDER: ICD-10-CM

## 2024-07-12 NOTE — TELEPHONE ENCOUNTER
Incoming Refill Request      Medication requested (name and dose): prozac 10mg   Prozac 20mg    Pharmacy where request should be sent: [facility]    Additional details provided by patient: n/a    Best call back number: [phone]    Does the patient have less than a 3 day supply:  [] Yes  [x] No    Miriam Etienne  07/12/24, 16:43 EDT

## 2024-07-15 RX ORDER — FLUOXETINE 10 MG/1
10 CAPSULE ORAL DAILY
Qty: 30 CAPSULE | Refills: 0 | Status: SHIPPED | OUTPATIENT
Start: 2024-07-15

## 2024-07-15 RX ORDER — FLUOXETINE HYDROCHLORIDE 20 MG/1
20 CAPSULE ORAL DAILY
Qty: 30 CAPSULE | Refills: 5 | Status: SHIPPED | OUTPATIENT
Start: 2024-07-15

## 2024-07-22 ENCOUNTER — TELEPHONE (OUTPATIENT)
Dept: INTERNAL MEDICINE | Facility: CLINIC | Age: 29
End: 2024-07-22

## 2024-07-22 NOTE — TELEPHONE ENCOUNTER
Caller: ALANARCHANA    Relationship: Emergency Contact    Best call back number: [phone]    What was the call regarding: WIFE STATES THAT PATIENT WOULD LIKE ALL FUTURE MEDICATION SENT TO   [facility] - [facility] Parks - [address] [facility] AT Portal to Reg[facility] - [phone]  - [phone] FX

## 2024-07-26 ENCOUNTER — OFFICE VISIT (OUTPATIENT)
Dept: INTERNAL MEDICINE | Facility: CLINIC | Age: 29
End: 2024-07-26
Payer: COMMERCIAL

## 2024-07-26 VITALS
WEIGHT: 229 LBS | DIASTOLIC BLOOD PRESSURE: 84 MMHG | BODY MASS INDEX: 31.02 KG/M2 | OXYGEN SATURATION: 100 % | SYSTOLIC BLOOD PRESSURE: 135 MMHG | TEMPERATURE: 97.5 F | RESPIRATION RATE: 14 BRPM | HEART RATE: 63 BPM | HEIGHT: 72 IN

## 2024-07-26 DIAGNOSIS — Z00.00 ROUTINE GENERAL MEDICAL EXAMINATION AT A HEALTH CARE FACILITY: Primary | ICD-10-CM

## 2024-07-26 PROCEDURE — 99395 PREV VISIT EST AGE 18-39: CPT | Performed by: INTERNAL MEDICINE

## 2024-07-26 RX ORDER — ATENOLOL 25 MG/1
25 TABLET ORAL DAILY
Qty: 90 TABLET | Refills: 3 | Status: SHIPPED | OUTPATIENT
Start: 2024-07-26

## 2024-07-26 RX ORDER — ATENOLOL 25 MG/1
1 TABLET ORAL DAILY
COMMUNITY
Start: 2024-05-17 | End: 2024-07-26 | Stop reason: SDUPTHER

## 2024-07-26 RX ORDER — ESOMEPRAZOLE MAGNESIUM 40 MG/1
40 CAPSULE, DELAYED RELEASE ORAL
Qty: 90 CAPSULE | Refills: 3 | Status: SHIPPED | OUTPATIENT
Start: 2024-07-26

## 2024-07-26 NOTE — PROGRESS NOTES
Chief Complaint   Patient presents with    Annual Exam       Dusty Aj is a 29 y.o. male and is here for a comprehensive physical exam. The patient reports no problems.     History:  Erectile dysfunction  no  Nocturia  no      Do you take any herbs or supplements that were not prescribed by a doctor? no    Are you taking aspirin daily? no      Health Habits:  Dental Exam. unknown  Eye Exam. up to date  Exercise: 3 times/week.  Current exercise activities include: walking    Health Maintenance   Topic Date Due    HEPATITIS C SCREENING  Never done    ANNUAL PHYSICAL  Never done    TDAP/TD VACCINES (2 - Td or Tdap) 08/06/2017    COVID-19 Vaccine (1 - 2023-24 season) 10/15/2024 (Originally 9/1/2023)    INFLUENZA VACCINE  08/01/2024    BMI FOLLOWUP  02/23/2025    Pneumococcal Vaccine 0-64  Aged Out       PMH, PSH, SocHx, FamHx, Allergies, and Medications: Reviewed and updated in the Visit Navigator.     No Known Allergies  Past Medical History:   Diagnosis Date    Anxiety Younger    Has gotten worse the last couple months. Especially the last month.    Depression     GERD (gastroesophageal reflux disease)     Migraines      Past Surgical History:   Procedure Laterality Date    CHOLECYSTECTOMY  10/06/2012    COLON SURGERY       Social History     Socioeconomic History    Marital status:    Tobacco Use    Smoking status: Never    Smokeless tobacco: Never   Vaping Use    Vaping status: Never Used   Substance and Sexual Activity    Alcohol use: Not Currently     Comment: social    Drug use: No    Sexual activity: Yes     Partners: Female     Birth control/protection: Condom, I.U.D.     Family History   Problem Relation Age of Onset    Anxiety disorder Mother         Mulitple siblings and other family members suffer anxiety    Depression Mother         Multiple family members and siblings suffer from depression    Mental illness Mother         Multiple family members and siblings suffer from    No Known Problems  "Father     No Known Problems Sister     No Known Problems Brother     Hypertension Maternal Grandfather        Review of Systems  Review of Systems   Constitutional: Negative.  Negative for activity change, appetite change, fatigue and fever.   HENT:  Negative for congestion, ear discharge, ear pain and trouble swallowing.    Eyes:  Negative for photophobia and visual disturbance.   Respiratory:  Negative for cough and shortness of breath.    Cardiovascular:  Negative for chest pain and palpitations.   Gastrointestinal:  Negative for abdominal distention, abdominal pain, constipation, diarrhea, nausea and vomiting.   Endocrine: Negative.    Genitourinary:  Negative for dysuria, hematuria and urgency.   Musculoskeletal:  Positive for back pain. Negative for arthralgias, joint swelling and myalgias.   Skin:  Negative for color change and rash.   Allergic/Immunologic: Negative.    Neurological:  Negative for dizziness, weakness, light-headedness and headaches.   Hematological:  Negative for adenopathy. Does not bruise/bleed easily.   Psychiatric/Behavioral:  Negative for agitation, confusion and dysphoric mood. The patient is not nervous/anxious.        Vitals:    07/26/24 1351   BP: 135/84   Pulse: 63   Resp: 14   Temp: 97.5 °F (36.4 °C)   SpO2: 100%       Objective   /84   Pulse 63   Temp 97.5 °F (36.4 °C)   Resp 14   Ht 182.9 cm (72\")   Wt 104 kg (229 lb)   SpO2 100%   BMI 31.06 kg/m²     Physical Exam  Constitutional:       General: He is not in acute distress.     Appearance: He is well-developed.   HENT:      Nose: Nose normal.   Eyes:      General: No scleral icterus.     Conjunctiva/sclera: Conjunctivae normal.   Neck:      Thyroid: No thyromegaly.      Trachea: No tracheal deviation.   Cardiovascular:      Rate and Rhythm: Normal rate and regular rhythm.      Heart sounds: No murmur heard.     No friction rub.   Pulmonary:      Effort: No respiratory distress.      Breath sounds: No wheezing or " "rales.   Abdominal:      General: There is no distension.      Palpations: Abdomen is soft. There is no mass.      Tenderness: There is no abdominal tenderness. There is no guarding.   Musculoskeletal:         General: No deformity. Normal range of motion.   Lymphadenopathy:      Cervical: No cervical adenopathy.   Skin:     General: Skin is warm and dry.      Findings: No erythema or rash.   Neurological:      Mental Status: He is alert and oriented to person, place, and time.      Cranial Nerves: No cranial nerve deficit.      Coordination: Coordination normal.      Deep Tendon Reflexes: Reflexes are normal and symmetric.   Psychiatric:         Behavior: Behavior normal.         Thought Content: Thought content normal.         Judgment: Judgment normal.       The CVD Risk score (ALVINA'Agostino, et al., 2008) failed to calculate for the following reasons:    The 2008 CVD risk score is only valid for ages 30 to 74    No results found for: \"CHOL\", \"CHLPL\", \"TRIG\", \"HDL\", \"LDL\", \"LDLDIRECT\"  Glucose   Date Value Ref Range Status   02/18/2024 98 65 - 99 mg/dL Final     BUN   Date Value Ref Range Status   02/18/2024 10 6 - 20 mg/dL Final     Creatinine   Date Value Ref Range Status   02/18/2024 1.08 0.76 - 1.27 mg/dL Final     Sodium   Date Value Ref Range Status   02/18/2024 137 136 - 145 mmol/L Final     Potassium   Date Value Ref Range Status   02/18/2024 3.4 (L) 3.5 - 5.2 mmol/L Final     Chloride   Date Value Ref Range Status   02/18/2024 102 98 - 107 mmol/L Final     CO2   Date Value Ref Range Status   02/18/2024 23.8 22.0 - 29.0 mmol/L Final     Calcium   Date Value Ref Range Status   02/18/2024 9.1 8.6 - 10.5 mg/dL Final     Total Protein   Date Value Ref Range Status   02/18/2024 7.6 6.0 - 8.5 g/dL Final     Albumin   Date Value Ref Range Status   02/18/2024 4.7 3.5 - 5.2 g/dL Final     ALT (SGPT)   Date Value Ref Range Status   02/18/2024 70 (H) 1 - 41 U/L Final     AST (SGOT)   Date Value Ref Range Status "   02/18/2024 26 1 - 40 U/L Final     Alkaline Phosphatase   Date Value Ref Range Status   02/18/2024 89 39 - 117 U/L Final     Total Bilirubin   Date Value Ref Range Status   02/18/2024 0.6 0.0 - 1.2 mg/dL Final     BUN/Creatinine Ratio   Date Value Ref Range Status   02/18/2024 9.3 7.0 - 25.0 Final     Anion Gap   Date Value Ref Range Status   02/18/2024 11.2 5.0 - 15.0 mmol/L Final     Lab Results   Component Value Date    WBC 6.84 02/18/2024    HGB 15.1 02/18/2024    HCT 43.6 02/18/2024    MCV 85.0 02/18/2024     02/18/2024       Assessment & Plan   1. Healthy male exam.   2. Patient Counseling: Including but not Limited to the following, when appropriate:  --Nutrition: Stressed importance of moderation in sodium/caffeine intake, saturated fat and cholesterol, caloric balance, sufficient intake of fresh fruits, vegetables, fiber    --Exercise: Stressed the importance of regular exercise.   --Substance Abuse: Discussed cessation/primary prevention of tobacco, alcohol, or other drug use; driving or other dangerous activities under the influence; availability of treatment for abuse, as indicated based on social history.    --Sexuality: Discussed sexually transmitted diseases, partner selection, use of condoms and contraceptive alternatives.   --Injury prevention: Discussed safety belts, safety helmets, smoke detector, smoking near bedding or upholstery.   --Dental health: Discussed importance of regular tooth brushing, flossing, and dental visits.  --Immunizations reviewed.  --Discussed benefits of colon cancer screening.      3. Discussed the patient's BMI with him. BMI is >= 30 and <35. (Class 1 Obesity). The following options were offered after discussion;: nutrition counseling/recommendations  . The BMI is above average; BMI management plan is completed  4. No follow-ups on file.  5. Age-appropriate Screening Scheduled  6. There are no Patient Instructions on file for this visit.    Assessment & Plan      Diagnoses and all orders for this visit:    1. Routine general medical examination at a health care facility (Primary)

## 2024-08-14 RX ORDER — ATENOLOL 25 MG/1
25 TABLET ORAL DAILY
Qty: 90 TABLET | Refills: 0 | OUTPATIENT
Start: 2024-08-14

## 2024-10-30 DIAGNOSIS — F41.1 GENERALIZED ANXIETY DISORDER: ICD-10-CM

## 2024-10-30 DIAGNOSIS — F41.9 ANXIETY: ICD-10-CM

## 2024-10-30 RX ORDER — HYDROXYZINE HYDROCHLORIDE 25 MG/1
25 TABLET, FILM COATED ORAL 3 TIMES DAILY PRN
Qty: 90 TABLET | Refills: 2 | Status: SHIPPED | OUTPATIENT
Start: 2024-10-30

## 2024-10-30 RX ORDER — ESOMEPRAZOLE MAGNESIUM 40 MG/1
40 CAPSULE, DELAYED RELEASE ORAL
Qty: 90 CAPSULE | Refills: 3 | Status: CANCELLED | OUTPATIENT
Start: 2024-10-30

## 2024-10-30 RX ORDER — FLUOXETINE 10 MG/1
10 CAPSULE ORAL DAILY
Qty: 90 CAPSULE | Refills: 1 | Status: SHIPPED | OUTPATIENT
Start: 2024-10-30

## 2024-10-30 NOTE — TELEPHONE ENCOUNTER
Incoming Refill Request      Medication requested (name and dose): FLUOXETINE 20 AND 10 MG    Pharmacy where request should be sent: [facility]    Additional details provided by patient: NO NEXT APPT [facility]    Best call back number: [phone]    Does the patient have less than a 3 day supply:  [] Yes  [x] No    Alexandro uA Rep  10/30/24, 13:27 EDT

## 2024-10-30 NOTE — TELEPHONE ENCOUNTER
Prescriptions sent; please call and schedule follow up appointment before next prescription renewal.

## 2024-10-30 NOTE — TELEPHONE ENCOUNTER
Incoming Refill Request      Medication requested (name and dose): HYDROXYZINE  ESOMEPRAZOLE    Pharmacy where request should be sent: [facility]    Additional details provided by patient: NO UPCOMING APPT    Best call back number: [phone]    Does the patient have less than a 3 day supply:  [] Yes  [x] No    Alexandro Au Rep  10/30/24, 13:29 EDT

## 2025-03-25 DIAGNOSIS — F41.1 GENERALIZED ANXIETY DISORDER: ICD-10-CM

## 2025-03-25 RX ORDER — FLUOXETINE 10 MG/1
10 CAPSULE ORAL DAILY
Qty: 90 CAPSULE | Refills: 1 | Status: SHIPPED | OUTPATIENT
Start: 2025-03-25

## 2025-03-25 RX ORDER — ESOMEPRAZOLE MAGNESIUM 40 MG/1
40 CAPSULE, DELAYED RELEASE ORAL
Qty: 90 CAPSULE | Refills: 3 | Status: SHIPPED | OUTPATIENT
Start: 2025-03-25

## 2025-03-25 NOTE — TELEPHONE ENCOUNTER
Incoming Refill Request      Medication requested (name and dose): FLUOXETINE 10MG AND FLUOXETINE 20 MG     Pharmacy where request should be sent: [facility] [place]    Additional details provided by patient: N/A    Best call back number: [phone]    Does the patient have less than a 3 day supply:  [x] Yes  [] No    Alexandro Condon Rep  03/25/25, 13:41 EDT

## 2025-03-28 DIAGNOSIS — F41.1 GENERALIZED ANXIETY DISORDER: ICD-10-CM

## 2025-03-28 NOTE — TELEPHONE ENCOUNTER
Incoming Refill Request      Medication requested (name and dose): fluoxetine 20mg    Pharmacy where request should be sent: [place] [facility]    Additional details provided by patient:  one day left    Best call back number: [phone]    Does the patient have less than a 3 day supply:  [x] Yes  [] No    Alexandro Mccormack Rep  03/28/25, 13:14 EDT

## 2025-04-16 ENCOUNTER — TELEMEDICINE (OUTPATIENT)
Age: 30
End: 2025-04-16
Payer: MEDICAID

## 2025-04-16 DIAGNOSIS — Z53.21 PATIENT LEFT WITHOUT BEING SEEN: Primary | ICD-10-CM
